# Patient Record
Sex: FEMALE | Race: WHITE | NOT HISPANIC OR LATINO | Employment: UNEMPLOYED | URBAN - METROPOLITAN AREA
[De-identification: names, ages, dates, MRNs, and addresses within clinical notes are randomized per-mention and may not be internally consistent; named-entity substitution may affect disease eponyms.]

---

## 2019-07-03 ENCOUNTER — HOSPITAL ENCOUNTER (EMERGENCY)
Facility: MEDICAL CENTER | Age: 49
End: 2019-07-04
Attending: EMERGENCY MEDICINE
Payer: MEDICAID

## 2019-07-03 ENCOUNTER — APPOINTMENT (OUTPATIENT)
Dept: RADIOLOGY | Facility: MEDICAL CENTER | Age: 49
End: 2019-07-03
Attending: EMERGENCY MEDICINE
Payer: MEDICAID

## 2019-07-03 DIAGNOSIS — S89.92XA INJURY OF LEFT KNEE, INITIAL ENCOUNTER: ICD-10-CM

## 2019-07-03 DIAGNOSIS — M25.562 ACUTE PAIN OF LEFT KNEE: ICD-10-CM

## 2019-07-03 PROCEDURE — 73700 CT LOWER EXTREMITY W/O DYE: CPT | Mod: LT

## 2019-07-03 PROCEDURE — 73564 X-RAY EXAM KNEE 4 OR MORE: CPT | Mod: LT

## 2019-07-03 PROCEDURE — 700102 HCHG RX REV CODE 250 W/ 637 OVERRIDE(OP): Performed by: EMERGENCY MEDICINE

## 2019-07-03 PROCEDURE — 700101 HCHG RX REV CODE 250: Performed by: EMERGENCY MEDICINE

## 2019-07-03 PROCEDURE — 96372 THER/PROPH/DIAG INJ SC/IM: CPT

## 2019-07-03 PROCEDURE — A9270 NON-COVERED ITEM OR SERVICE: HCPCS | Performed by: EMERGENCY MEDICINE

## 2019-07-03 PROCEDURE — 99285 EMERGENCY DEPT VISIT HI MDM: CPT

## 2019-07-03 PROCEDURE — 73610 X-RAY EXAM OF ANKLE: CPT | Mod: LT

## 2019-07-03 PROCEDURE — 700111 HCHG RX REV CODE 636 W/ 250 OVERRIDE (IP): Performed by: EMERGENCY MEDICINE

## 2019-07-03 RX ORDER — CYCLOBENZAPRINE HCL 10 MG
10 TABLET ORAL ONCE
Status: COMPLETED | OUTPATIENT
Start: 2019-07-03 | End: 2019-07-03

## 2019-07-03 RX ORDER — OXYCODONE HYDROCHLORIDE AND ACETAMINOPHEN 5; 325 MG/1; MG/1
2 TABLET ORAL ONCE
Status: COMPLETED | OUTPATIENT
Start: 2019-07-03 | End: 2019-07-03

## 2019-07-03 RX ORDER — LIDOCAINE 50 MG/G
1 PATCH TOPICAL ONCE
Status: DISCONTINUED | OUTPATIENT
Start: 2019-07-03 | End: 2019-07-04 | Stop reason: HOSPADM

## 2019-07-03 RX ORDER — KETOROLAC TROMETHAMINE 30 MG/ML
15 INJECTION, SOLUTION INTRAMUSCULAR; INTRAVENOUS ONCE
Status: COMPLETED | OUTPATIENT
Start: 2019-07-03 | End: 2019-07-03

## 2019-07-03 RX ADMIN — LIDOCAINE 1 PATCH: 50 PATCH TOPICAL at 23:37

## 2019-07-03 RX ADMIN — OXYCODONE HYDROCHLORIDE AND ACETAMINOPHEN 2 TABLET: 5; 325 TABLET ORAL at 21:02

## 2019-07-03 RX ADMIN — KETOROLAC TROMETHAMINE 15 MG: 30 INJECTION, SOLUTION INTRAMUSCULAR at 21:02

## 2019-07-03 RX ADMIN — CYCLOBENZAPRINE HYDROCHLORIDE 10 MG: 10 TABLET, FILM COATED ORAL at 22:34

## 2019-07-04 VITALS
WEIGHT: 155 LBS | HEIGHT: 67 IN | HEART RATE: 78 BPM | DIASTOLIC BLOOD PRESSURE: 77 MMHG | OXYGEN SATURATION: 90 % | TEMPERATURE: 98.4 F | SYSTOLIC BLOOD PRESSURE: 125 MMHG | BODY MASS INDEX: 24.33 KG/M2

## 2019-07-04 RX ORDER — CYCLOBENZAPRINE HCL 5 MG
5-10 TABLET ORAL 3 TIMES DAILY PRN
Qty: 30 TAB | Refills: 0 | Status: SHIPPED | OUTPATIENT
Start: 2019-07-04 | End: 2022-01-27

## 2019-07-04 RX ORDER — LIDOCAINE 50 MG/G
1 PATCH TOPICAL EVERY 24 HOURS
Qty: 21 PATCH | Refills: 0 | Status: SHIPPED | OUTPATIENT
Start: 2019-07-04 | End: 2019-07-25

## 2019-07-04 NOTE — ED PROVIDER NOTES
"ED Provider Note    Scribed for Papo Ramos M.D. by Chinmay Yadav. 7/3/2019  8:39 PM    Primary care provider: Pcp Pt States None  Means of arrival: EMS  History obtained from: patient  History limited by: none    CHIEF COMPLAINT  Chief Complaint   Patient presents with   • Knee Injury     Pt reportsd she was laying down, went to get up and left knee \"locked up\". This has happened before approx 3 yrs ago. Pt was given injection then and told to f/u with ortho- but she didn't.        HPI  Maggie Escobar is a 48 y.o. female who presents to the Emergency Department for left knee pain, onset earlier today. She states she was at the [employer] pool when she got up quickly and her knee locked. The patient states that the pain has gradually worsened. She states that her knee has been intermittently locking for the past 8 years, but this is the worst pain yet. In the past the pain ws allowed to be alleviated with some kid of shot. When she tries to move it feels like something is going to break. She denies any left hip pain. She has chronic lower back pain. She states no modifying or alleviating factors.    She denies any dislocation at this time.        She also reports left ankle pain, onset 3 months ago. She states that is continues to worsen throughout the day.     REVIEW OF SYSTEMS  Pertinent positives include: left knee pain. Pertinent negatives include: left hip pain. See history of present illness.    PAST MEDICAL HISTORY       SURGICAL HISTORY  patient denies any surgical history    SOCIAL HISTORY  Social History   Substance Use Topics   • Smoking status: Not on file   • Smokeless tobacco: Not on file   • Alcohol use Not on file      History   Drug use: Unknown       FAMILY HISTORY  No family history on file.    CURRENT MEDICATIONS  Home Medications    **Home medications have not yet been reviewed for this encounter**         ALLERGIES  Allergies   Allergen Reactions   • Pcn [Penicillins] Hives       PHYSICAL " "EXAM  VITAL SIGNS: /77   Temp 36.9 °C (98.4 °F) (Temporal)   Ht 1.702 m (5' 7\")   Wt 70.3 kg (155 lb)   SpO2 95%   BMI 24.28 kg/m²      Constitutional: Alert in no apparent distress.  HENT: No signs of trauma, Bilateral external ears normal, Nose normal. Uvula midline.   Eyes: Pupils are equal and reactive, Conjunctiva normal, Non-icteric.   Neck: Normal range of motion, No tenderness, Supple, No stridor.   Lymphatic: No lymphadenopathy noted.   Cardiovascular: Regular rate and rhythm, no murmurs.   Thorax & Lungs: Normal breath sounds, No respiratory distress, No wheezing, No chest tenderness.   Abdomen:  Soft, No tenderness, No peritoneal signs, No masses, No pulsatile masses.   Skin: Warm, Dry, No erythema, No rash.   Back: No bony tenderness, No CVA tenderness.   Extremities: Intact distal pulses, No edema,  No cyanosis.  Musculoskeletal:  Tenderness to palpation on lateral aspect of tibial plateua. no major deformities noted. 2+ peripheral pulses. normal distal dorsiflexion and plantarflexion.  5 out of 5 strength proximally.  Pain with knee range of motion.  No appreciable knee laxity or ligamentous laxity.  Negative anterior posterior drawer test and no appreciable lateral or medial laxity.  Neurologic: Alert , Normal motor function, Normal sensory function, No focal deficits noted.   Psychiatric: Affect normal, Judgment normal, Mood normal.     DIAGNOSTIC STUDIES / PROCEDURES    RADIOLOGY  CT-KNEE W/O LEFT   Final Result      1.  No acute fracture   2.  No joint effusion   3.  No bony erosion   4.  Loss of medial compartmental knee joint space.   5.  Meniscal or ligamentous injury is not excluded by CT      DX-KNEE COMPLETE 4+ LEFT   Final Result      No radiographic evidence of acute traumatic injury.      DX-ANKLE 3+ VIEWS LEFT   Final Result      No radiographic evidence of acute traumatic injury.        The radiologist's interpretation of all radiological studies have been reviewed by " me.    COURSE & MEDICAL DECISION MAKING  Nursing notes, VS, PMSFHx reviewed in chart.    48 y.o. female p/w chief complaint of left knee pain.    8:39 PM Patient seen and examined at bedside. I informed her that she will be placed in a knee immobilizer.     Pt repeated requesting injection of steroid or lidocaine that provided her with immediate relief at her last visit. At this time I do not feel comfortable giving her a numbing shot due to the potential of injury while affected extremity/knee is numb. She will receive imaging to rule out abdomnormality. She will be medicated with Oxycodone 325 mg and Toradol 15 mg for her pain.    10:24 PM Patient was reevaluated at bedside. Discussed radiology results with the patient and informed them that there is no acute traumatic injury.    I reinforced to the patient that giving her numbing injections will put her at risk for tearing ligaments.     10:49 Ortho was paged at this time.     10:54 PM I discussed the patient's case and the above findings with Dr. Benoit (Ortho) who advised to not administer a numbing/steriod shot.     Given patient's location of pain and ongoing pain, I obtain CT scan to rule out potential tibial plateau fracture or patellar dislocation/fracture not seen on xr    No e/o fx on CT.     11:24 PM Patient was reevaluated at bedside.    1:22 AM Patient was reevaluated at bedside. Discussed findings.  Patient states that pain is significantly improved with lidocaine patch.    I discussed follow up plans with ortho in next few days with pt. I discussed knee immobilization usage.  I am concerned that patient's pain may be secondary to chronic patellar dislocation and relocation and associated ligamentous injury.  No obvious physical exam evidence of patellar tendon rupture or fracture.  Patella is in line on physical exam and patient with full range of motion.  Distal sensation intact to light touch along with 2+ peripheral pulses.  Distal strength  intact.  I spent extensive time at patient's bedside discussing need for Ortho follow-up and work restrictions and home pain medication including alternating Tylenol and ibuprofen every 3 hours for the next several days.  Patient agrees to follow-up with orthopedist or primary care in less than week.  I feel like patient would likely benefit from outpatient MRI or physical therapy.    The patient will return for new or worsening symptoms and is stable at the time of discharge.    The patient is referred to a primary physician for blood pressure management, diabetic screening, and for all other preventative health concerns.    DISPOSITION:  Patient will be discharged home in stable condition.    FOLLOW UP:  Ramin Benoit M.D.  [address]  [facility] [address]  [phone]      Please call to schedule follow up appointment in next week with orthopedic surgery      OUTPATIENT MEDICATIONS:  Discharge Medication List as of 7/4/2019  1:15 AM      START taking these medications    Details   cyclobenzaprine (FLEXERIL) 5 MG tablet Take 1-2 Tabs by mouth 3 times a day as needed., Disp-30 Tab, R-0, Print Rx Paper      lidocaine (LIDODERM) 5 % Patch Apply 1 Patch to skin as directed every 24 hours for 21 days., Disp-21 Patch, R-0, Print Rx Paper              FINAL IMPRESSION  1. Injury of left knee, initial encounter    2. Acute pain of left knee          Chinmay CUEVAS (Scribe), am scribing for, and in the presence of, Papo Ramos M.D..    Electronically signed by: Chinmay Yadav (Scribe), 7/3/2019    Papo CUEVAS M.D. personally performed the services described in this documentation, as scribed by Chinmay Yadav in my presence, and it is both accurate and complete.  E  The note accurately reflects work and decisions made by me.  Papo Ramos  7/4/2019  2:33 AM

## 2019-07-04 NOTE — ED TRIAGE NOTES
"Chief Complaint   Patient presents with   • Knee Injury     Pt reportsd she was laying down, went to get up and left knee \"locked up\". This has happened before approx 3 yrs ago. Pt was given injection then and told to f/u with ortho- but she didn't.      [facility]. Pain in knee 7/10. Unable to move joint. Cold pack applied.   "

## 2019-07-04 NOTE — ED NOTES
Discharge instructions given to pt, pt verbalized understanding. VSS. Sent two prescriptions. All personal belongings accounted for. Pt used wheelchair to ED lobby. No IV.

## 2019-07-15 ENCOUNTER — HOSPITAL ENCOUNTER (EMERGENCY)
Facility: MEDICAL CENTER | Age: 49
End: 2019-07-15
Attending: EMERGENCY MEDICINE
Payer: MEDICAID

## 2019-07-15 VITALS
BODY MASS INDEX: 24.33 KG/M2 | RESPIRATION RATE: 16 BRPM | DIASTOLIC BLOOD PRESSURE: 87 MMHG | HEART RATE: 84 BPM | SYSTOLIC BLOOD PRESSURE: 129 MMHG | WEIGHT: 155 LBS | TEMPERATURE: 98.2 F | OXYGEN SATURATION: 97 % | HEIGHT: 67 IN

## 2019-07-15 DIAGNOSIS — M25.562 ACUTE PAIN OF LEFT KNEE: ICD-10-CM

## 2019-07-15 PROCEDURE — 99283 EMERGENCY DEPT VISIT LOW MDM: CPT

## 2019-07-15 RX ORDER — NAPROXEN 500 MG/1
500 TABLET ORAL 2 TIMES DAILY WITH MEALS
Qty: 20 TAB | Refills: 0 | Status: SHIPPED | OUTPATIENT
Start: 2019-07-15 | End: 2022-01-27

## 2019-07-15 NOTE — DISCHARGE PLANNING
Pt in the lobby stating [facility] does not take her insurance. She has contacted another orthopedic doctor that does take her insurance and it will be over 2 weeks to be seen in the office. She states they told her to go to PCP to get order for MRI (pt does not have PCP). She states she is in excruciating pain and cannot wait two weeks. She will need to check back in to ED as I cannot expedite appt.

## 2019-07-15 NOTE — ED TRIAGE NOTES
Chief Complaint   Patient presents with   • Knee Pain     left     States left knee locking up.  Seen here on 7-3-19 with similar symptoms.  Instructed to follow up with ortho.  Unable to due to insurance issues.  Requesting referral to ortho.  Triage process explained to patient.  Pt back to waiting room.  Pt instructed to inform RN if any changes or questions arise.

## 2019-07-15 NOTE — ED PROVIDER NOTES
"ED Provider Note    ED Provider    Means of arrival: Private car  History obtained from: Patient  History limited by: None    CHIEF COMPLAINT  Chief Complaint   Patient presents with   • Knee Pain     left       [facility]  Maggie Escobar is a 48 y.o. female who presents complaints of left knee pain.  This been going since the beginning of this month.  She was seen here brought in by ambulance and had a CT scan that showed no fracture.  She was seen by Dr. Gail estrada and at a outpatient visit, Dr. dorene estrada and does not accept her insurance they did see her the one time and did a injection into the knee.  This did provide temporary relief but now the pain is coming back.  Pain is worse with movement, and weightbearing.  She does have crutches and a knee immobilizer to assist with that she was not prescribed pain medication.    The initial pain came from the knee just locking up while she was in a fetal position, with knees bent.    She had a history of knees locking in the past that resolved with cortisone injection.    REVIEW OF SYSTEMS  See HPI for further details.     PAST MEDICAL HISTORY       SOCIAL HISTORY  Social History     Social History Main Topics   • Smoking status: Never Smoker   • Smokeless tobacco: Never Used   • Alcohol use No   • Drug use: No   • Sexual activity: Not on file       SURGICAL HISTORY  patient denies any surgical history    CURRENT MEDICATIONS  Home Medications    **Home medications have not yet been reviewed for this encounter**         ALLERGIES  Allergies   Allergen Reactions   • Pcn [Penicillins] Hives       PHYSICAL EXAM  VITAL SIGNS: /77   Pulse 98   Temp 36 °C (96.8 °F) (Temporal)   Resp 16   Ht 1.702 m (5' 7\")   Wt 70.3 kg (155 lb)   SpO2 97%   BMI 24.28 kg/m²   Constitutional: Alert in no apparent distress.  HENT: Normocephalic, Atraumatic, Mucous membranes are moist  Eyes:  Conjunctiva normal, non-icteric.   Heart: no peripheral cyanosis  Lungs: respirations are even " and unlabored  Skin: Warm, Dry,normal color  Neurologic: Alert, Grossly non-focal.   Psychiatric: Affect normal, Judgment normal, Mood normal, Appears appropriate and not intoxicated.   Extremity the left lower extremity has any showing no swelling, no effusion.  No bony tenderness.  Range of motion does elicit tenderness.  Distal neurovascular is normal    MEDICAL DECISION MAKING  This is a 48 y.o. female who presents knee pain, atraumatic.  CTs have been negative.  She is having difficulty getting a referral because she has to go through her primary doctor which she does not have to get the referral to see a limited number of orthopedics on her plan.    The patient has a list of orthopedics, I did give her an ER referral to a Dr. Pro however do not know if this will be adequate for insurance purposes.  I did explain this to the patient that she may still need to see her primary doctor.  She is given a prescription for Naprosyn for pain, she is to use her crutches and knee immobilizer for weightbearing as tolerated.  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    DIAGNOSTIC STUDIES / PROCEDURES    LABS      RADIOLOGY  No orders to display       COURSE  Pertinent Labs & Imaging studies reviewed. (See chart for details)      FINAL IMPRESSION  1. Acute pain of left knee

## 2019-07-15 NOTE — DISCHARGE INSTRUCTIONS
You are being referred from the ER to Dr. Pro, this may work well enough for the insurance company sometimes you will need to see your primary doctor to get an official referral to the orthopedist.  Otherwise continue with crutches, weightbearing as tolerated.  You are being prescribed medication to assist with the discomfort.

## 2020-11-23 ENCOUNTER — APPOINTMENT (OUTPATIENT)
Dept: RADIOLOGY | Facility: MEDICAL CENTER | Age: 50
End: 2020-11-23
Attending: EMERGENCY MEDICINE
Payer: MEDICAID

## 2020-11-23 ENCOUNTER — HOSPITAL ENCOUNTER (EMERGENCY)
Facility: MEDICAL CENTER | Age: 50
End: 2020-11-23
Attending: EMERGENCY MEDICINE
Payer: MEDICAID

## 2020-11-23 VITALS
TEMPERATURE: 97.1 F | HEART RATE: 69 BPM | OXYGEN SATURATION: 97 % | RESPIRATION RATE: 16 BRPM | SYSTOLIC BLOOD PRESSURE: 108 MMHG | HEIGHT: 67 IN | WEIGHT: 179.12 LBS | DIASTOLIC BLOOD PRESSURE: 65 MMHG | BODY MASS INDEX: 28.11 KG/M2

## 2020-11-23 DIAGNOSIS — K59.00 CONSTIPATION, UNSPECIFIED CONSTIPATION TYPE: ICD-10-CM

## 2020-11-23 DIAGNOSIS — R10.9 ABDOMINAL PAIN, UNSPECIFIED ABDOMINAL LOCATION: ICD-10-CM

## 2020-11-23 LAB
ALBUMIN SERPL BCP-MCNC: 4.7 G/DL (ref 3.2–4.9)
ALBUMIN/GLOB SERPL: 1.5 G/DL
ALP SERPL-CCNC: 93 U/L (ref 30–99)
ALT SERPL-CCNC: 33 U/L (ref 2–50)
ANION GAP SERPL CALC-SCNC: 10 MMOL/L (ref 7–16)
APPEARANCE UR: ABNORMAL
AST SERPL-CCNC: 31 U/L (ref 12–45)
BACTERIA #/AREA URNS HPF: ABNORMAL /HPF
BASOPHILS # BLD AUTO: 0.9 % (ref 0–1.8)
BASOPHILS # BLD: 0.06 K/UL (ref 0–0.12)
BILIRUB SERPL-MCNC: 0.4 MG/DL (ref 0.1–1.5)
BILIRUB UR QL STRIP.AUTO: NEGATIVE
BUN SERPL-MCNC: 6 MG/DL (ref 8–22)
CALCIUM SERPL-MCNC: 9.8 MG/DL (ref 8.5–10.5)
CHLORIDE SERPL-SCNC: 102 MMOL/L (ref 96–112)
CO2 SERPL-SCNC: 24 MMOL/L (ref 20–33)
COLOR UR: YELLOW
CREAT SERPL-MCNC: 0.63 MG/DL (ref 0.5–1.4)
EOSINOPHIL # BLD AUTO: 0.2 K/UL (ref 0–0.51)
EOSINOPHIL NFR BLD: 2.9 % (ref 0–6.9)
EPI CELLS #/AREA URNS HPF: ABNORMAL /HPF
ERYTHROCYTE [DISTWIDTH] IN BLOOD BY AUTOMATED COUNT: 40.7 FL (ref 35.9–50)
GLOBULIN SER CALC-MCNC: 3.1 G/DL (ref 1.9–3.5)
GLUCOSE SERPL-MCNC: 80 MG/DL (ref 65–99)
GLUCOSE UR STRIP.AUTO-MCNC: NEGATIVE MG/DL
HCG UR QL: NEGATIVE
HCT VFR BLD AUTO: 44.1 % (ref 37–47)
HGB BLD-MCNC: 14.4 G/DL (ref 12–16)
HYALINE CASTS #/AREA URNS LPF: ABNORMAL /LPF
IMM GRANULOCYTES # BLD AUTO: 0.05 K/UL (ref 0–0.11)
IMM GRANULOCYTES NFR BLD AUTO: 0.7 % (ref 0–0.9)
KETONES UR STRIP.AUTO-MCNC: NEGATIVE MG/DL
LEUKOCYTE ESTERASE UR QL STRIP.AUTO: NEGATIVE
LIPASE SERPL-CCNC: 19 U/L (ref 11–82)
LYMPHOCYTES # BLD AUTO: 2.37 K/UL (ref 1–4.8)
LYMPHOCYTES NFR BLD: 34.6 % (ref 22–41)
MCH RBC QN AUTO: 29.1 PG (ref 27–33)
MCHC RBC AUTO-ENTMCNC: 32.7 G/DL (ref 33.6–35)
MCV RBC AUTO: 89.3 FL (ref 81.4–97.8)
MICRO URNS: ABNORMAL
MONOCYTES # BLD AUTO: 0.3 K/UL (ref 0–0.85)
MONOCYTES NFR BLD AUTO: 4.4 % (ref 0–13.4)
NEUTROPHILS # BLD AUTO: 3.86 K/UL (ref 2–7.15)
NEUTROPHILS NFR BLD: 56.5 % (ref 44–72)
NITRITE UR QL STRIP.AUTO: NEGATIVE
NRBC # BLD AUTO: 0 K/UL
NRBC BLD-RTO: 0 /100 WBC
PH UR STRIP.AUTO: 5 [PH] (ref 5–8)
PLATELET # BLD AUTO: 279 K/UL (ref 164–446)
PMV BLD AUTO: 9.9 FL (ref 9–12.9)
POTASSIUM SERPL-SCNC: 4.3 MMOL/L (ref 3.6–5.5)
PROT SERPL-MCNC: 7.8 G/DL (ref 6–8.2)
PROT UR QL STRIP: NEGATIVE MG/DL
RBC # BLD AUTO: 4.94 M/UL (ref 4.2–5.4)
RBC # URNS HPF: ABNORMAL /HPF
RBC UR QL AUTO: NEGATIVE
SODIUM SERPL-SCNC: 136 MMOL/L (ref 135–145)
SP GR UR STRIP.AUTO: 1.02
UROBILINOGEN UR STRIP.AUTO-MCNC: 0.2 MG/DL
WBC # BLD AUTO: 6.8 K/UL (ref 4.8–10.8)
WBC #/AREA URNS HPF: ABNORMAL /HPF

## 2020-11-23 PROCEDURE — 36415 COLL VENOUS BLD VENIPUNCTURE: CPT

## 2020-11-23 PROCEDURE — 83690 ASSAY OF LIPASE: CPT

## 2020-11-23 PROCEDURE — 74176 CT ABD & PELVIS W/O CONTRAST: CPT

## 2020-11-23 PROCEDURE — 85025 COMPLETE CBC W/AUTO DIFF WBC: CPT

## 2020-11-23 PROCEDURE — 99283 EMERGENCY DEPT VISIT LOW MDM: CPT

## 2020-11-23 PROCEDURE — 81025 URINE PREGNANCY TEST: CPT

## 2020-11-23 PROCEDURE — 80053 COMPREHEN METABOLIC PANEL: CPT

## 2020-11-23 PROCEDURE — 81001 URINALYSIS AUTO W/SCOPE: CPT

## 2020-11-23 NOTE — ED TRIAGE NOTES
Maggie Escobar  49 y.o.  Chief Complaint   Patient presents with   • Constipation     x 4 weeks; only passing small hard balls of feces; pt reports trying to self disimpact but was unsuccessful; pt's abdomen is distended in triage; reports hx of same   • Flank Pain     R side; denies hx of kidney stones; pt reports dysuria but denies frequency, urgency, or hematuria; reports she was recently told she has WBCs in her urine

## 2020-11-24 NOTE — DISCHARGE INSTRUCTIONS
Please purchase a bottle of magnesium citrate, over the counter, and use as directed for constipation.   Please eat a high fiber diet.  Please return here for any other issues or concerns.

## 2020-11-24 NOTE — ED TRIAGE NOTES
"Patient vital signs rechecked and documented per [facility]. Patient denies any new needs at this time.  Patient updated on wait times, thanked for patience. Pt informed to alert triage tech or triage RN with any needs and/or changes in condition; patient verbalized understanding. Patient stated \"pain is getting worse'\"   "

## 2020-11-24 NOTE — ED PROVIDER NOTES
"ED Provider Note    CHIEF COMPLAINT  Chief Complaint   Patient presents with   • Constipation     x 4 weeks; only passing small hard balls of feces; pt reports trying to self disimpact but was unsuccessful; pt's abdomen is distended in triage; reports hx of same   • Flank Pain     R side; denies hx of kidney stones; pt reports dysuria but denies frequency, urgency, or hematuria; reports she was recently told she has WBCs in her urine       HPI  Maggie Escobar is a 49 y.o. female here for evaluation of abdominal distention.  Patient states that she has had this distention for 20 years, and that she always has constipation.  Patient states that it is becoming increasingly larger, but she denies any pain.  She has no fever chills or vomiting.  She does complain of some dysuria, urgency and frequency.  Nothing seems alleviate or exacerbate her symptoms, she has not taken any medication prior to this for the same.  Patient has no chest pain or shortness of breath.  Patient states that there is only distention, and no radiation of pain.  At this time, the patient states that she has no pain, but is here to \"get checked out.\"  She was supposed to have a CT scan done at [facility], and they would not do it because she did not have any lab work done recently.      ROS;  Please see HPI  O/W negative     PAST MEDICAL HISTORY   has a past medical history of Fibroid uterus.    SOCIAL HISTORY  Social History     Tobacco Use   • Smoking status: Never Smoker   • Smokeless tobacco: Never Used   Substance and Sexual Activity   • Alcohol use: No   • Drug use: No   • Sexual activity: Not on file       SURGICAL HISTORY   has a past surgical history that includes other orthopedic surgery.    CURRENT MEDICATIONS  Home Medications     Reviewed by Ronit Astorga R.N. (Registered Nurse) on 11/23/20 at 1521  Med List Status: Not Addressed   Medication Last Dose Status   cyclobenzaprine (FLEXERIL) 5 MG tablet  Active " "  hydrocodone-acetaminophen (NORCO) 7.5-325 MG per tablet  Active   naproxen (NAPROSYN) 500 MG Tab  Active                ALLERGIES  Allergies   Allergen Reactions   • Pcn [Penicillins] Hives       REVIEW OF SYSTEMS  See HPI for further details. Review of systems as above, otherwise all other systems are negative.     PHYSICAL EXAM  VITAL SIGNS: /74   Pulse 71   Temp 36.7 °C (98 °F) (Oral)   Resp 16   Ht 1.702 m (5' 7\")   Wt 81.3 kg (179 lb 2 oz)   SpO2 97%   BMI 28.05 kg/m²     Constitutional: Well developed, well nourished. No acute distress.  HEENT: Normocephalic, atraumatic. MMM  Neck: Supple, Full range of motion   Chest/Pulmonary:  No respiratory distress.  Equal expansion   Musculoskeletal: No deformity, no edema, neurovascular intact.  Abdomen; distended, no peritoneal signs  Neuro: Clear speech, appropriate, cooperative, cranial nerves II-XII grossly intact.  Psych: Normal mood and affect      Results for orders placed or performed during the hospital encounter of 11/23/20   URINALYSIS CULTURE, IF INDICATED    Specimen: Urine, Clean Catch   Result Value Ref Range    Color Yellow     Character Cloudy (A)     Specific Gravity 1.022 <1.035    Ph 5.0 5.0 - 8.0    Glucose Negative Negative mg/dL    Ketones Negative Negative mg/dL    Protein Negative Negative mg/dL    Bilirubin Negative Negative    Urobilinogen, Urine 0.2 Negative    Nitrite Negative Negative    Leukocyte Esterase Negative Negative    Occult Blood Negative Negative    Micro Urine Req Microscopic    HCG QUALITATIVE UR   Result Value Ref Range    Beta-Hcg Urine Negative Negative   CBC WITH DIFFERENTIAL   Result Value Ref Range    WBC 6.8 4.8 - 10.8 K/uL    RBC 4.94 4.20 - 5.40 M/uL    Hemoglobin 14.4 12.0 - 16.0 g/dL    Hematocrit 44.1 37.0 - 47.0 %    MCV 89.3 81.4 - 97.8 fL    MCH 29.1 27.0 - 33.0 pg    MCHC 32.7 (L) 33.6 - 35.0 g/dL    RDW 40.7 35.9 - 50.0 fL    Platelet Count 279 164 - 446 K/uL    MPV 9.9 9.0 - 12.9 fL    " Neutrophils-Polys 56.50 44.00 - 72.00 %    Lymphocytes 34.60 22.00 - 41.00 %    Monocytes 4.40 0.00 - 13.40 %    Eosinophils 2.90 0.00 - 6.90 %    Basophils 0.90 0.00 - 1.80 %    Immature Granulocytes 0.70 0.00 - 0.90 %    Nucleated RBC 0.00 /100 WBC    Neutrophils (Absolute) 3.86 2.00 - 7.15 K/uL    Lymphs (Absolute) 2.37 1.00 - 4.80 K/uL    Monos (Absolute) 0.30 0.00 - 0.85 K/uL    Eos (Absolute) 0.20 0.00 - 0.51 K/uL    Baso (Absolute) 0.06 0.00 - 0.12 K/uL    Immature Granulocytes (abs) 0.05 0.00 - 0.11 K/uL    NRBC (Absolute) 0.00 K/uL   COMP METABOLIC PANEL   Result Value Ref Range    Sodium 136 135 - 145 mmol/L    Potassium 4.3 3.6 - 5.5 mmol/L    Chloride 102 96 - 112 mmol/L    Co2 24 20 - 33 mmol/L    Anion Gap 10.0 7.0 - 16.0    Glucose 80 65 - 99 mg/dL    Bun 6 (L) 8 - 22 mg/dL    Creatinine 0.63 0.50 - 1.40 mg/dL    Calcium 9.8 8.5 - 10.5 mg/dL    AST(SGOT) 31 12 - 45 U/L    ALT(SGPT) 33 2 - 50 U/L    Alkaline Phosphatase 93 30 - 99 U/L    Total Bilirubin 0.4 0.1 - 1.5 mg/dL    Albumin 4.7 3.2 - 4.9 g/dL    Total Protein 7.8 6.0 - 8.2 g/dL    Globulin 3.1 1.9 - 3.5 g/dL    A-G Ratio 1.5 g/dL   LIPASE   Result Value Ref Range    Lipase 19 11 - 82 U/L   URINE MICROSCOPIC (W/UA)   Result Value Ref Range    WBC 2-5 /hpf    RBC 0-2 /hpf    Bacteria Few (A) None /hpf    Epithelial Cells Moderate (A) /hpf    Hyaline Cast 0-2 /lpf   ESTIMATED GFR   Result Value Ref Range    GFR If African American >60 >60 mL/min/1.73 m 2    GFR If Non African American >60 >60 mL/min/1.73 m 2     CT-RENAL COLIC EVALUATION(A/P W/O)   Final Result         1.  Constipation.   2.  Hepatic steatosis.   3.  Borderline hepatosplenomegaly.   4.  Right adnexal/ovarian cysts.                 PROCEDURES     MEDICAL RECORD  I have reviewed patient's medical record and pertinent results are listed.    COURSE & MEDICAL DECISION MAKING  I have reviewed any medical record information, laboratory studies and radiographic results as noted  above.    I you have had any blood pressure issues while here in the emergency department, please see your doctor for a further evaluation or work up.    7:45 PM  The pt has constipation on ct scan, and no other acute findings. I will recommend she uses colace and magnesium citrate for her constipation.   She will follow up, or return here for any other issues or concerns.     Differential diagnoses include but not limited to: Constipation, distention, small bowel obstruction, uterine fibroids    This patient presents with nominal pain.  At this time, I have counseled the patient/family regarding their medications, pain control, and follow up.  They will continue their medications, if any, as prescribed.  They will return immediately for any worsening symptoms and/or any other medical concerns.  They will see their doctor, or contact the doctor provided, in 1-2 days for follow up.       FINAL IMPRESSION  Abdominal pain  Constipation   Ovarian cyst      Electronically signed by: Iron De La Rosa D.O., 11/23/2020 6:48 PM

## 2020-11-24 NOTE — ED NOTES
Pt discharged. Pt provided information about chronic constipation. Pt educated to follow-up w/ PCP and to return to the hospital w/ any worsening symptoms. Pt walked to the lobby.

## 2022-01-19 ENCOUNTER — HOSPITAL ENCOUNTER (OUTPATIENT)
Dept: LAB | Facility: MEDICAL CENTER | Age: 52
End: 2022-01-19
Attending: NURSE PRACTITIONER
Payer: COMMERCIAL

## 2022-01-19 LAB
25(OH)D3 SERPL-MCNC: 29 NG/ML (ref 30–100)
ALBUMIN SERPL BCP-MCNC: 4.5 G/DL (ref 3.2–4.9)
ALBUMIN/GLOB SERPL: 1.6 G/DL
ALP SERPL-CCNC: 65 U/L (ref 30–99)
ALT SERPL-CCNC: 19 U/L (ref 2–50)
ANION GAP SERPL CALC-SCNC: 10 MMOL/L (ref 7–16)
AST SERPL-CCNC: 20 U/L (ref 12–45)
BASOPHILS # BLD AUTO: 1.6 % (ref 0–1.8)
BASOPHILS # BLD: 0.09 K/UL (ref 0–0.12)
BILIRUB SERPL-MCNC: 0.3 MG/DL (ref 0.1–1.5)
BUN SERPL-MCNC: 8 MG/DL (ref 8–22)
CALCIUM SERPL-MCNC: 9.4 MG/DL (ref 8.5–10.5)
CHLORIDE SERPL-SCNC: 103 MMOL/L (ref 96–112)
CHOLEST SERPL-MCNC: 161 MG/DL (ref 100–199)
CO2 SERPL-SCNC: 24 MMOL/L (ref 20–33)
CREAT SERPL-MCNC: 0.72 MG/DL (ref 0.5–1.4)
CRP SERPL HS-MCNC: <0.3 MG/DL (ref 0–0.75)
EOSINOPHIL # BLD AUTO: 0.19 K/UL (ref 0–0.51)
EOSINOPHIL NFR BLD: 3.4 % (ref 0–6.9)
ERYTHROCYTE [DISTWIDTH] IN BLOOD BY AUTOMATED COUNT: 42.7 FL (ref 35.9–50)
GLOBULIN SER CALC-MCNC: 2.9 G/DL (ref 1.9–3.5)
GLUCOSE SERPL-MCNC: 83 MG/DL (ref 65–99)
HCT VFR BLD AUTO: 39.6 % (ref 37–47)
HDLC SERPL-MCNC: 34 MG/DL
HGB BLD-MCNC: 12.8 G/DL (ref 12–16)
IMM GRANULOCYTES # BLD AUTO: 0.02 K/UL (ref 0–0.11)
IMM GRANULOCYTES NFR BLD AUTO: 0.4 % (ref 0–0.9)
LDLC SERPL CALC-MCNC: 99 MG/DL
LYMPHOCYTES # BLD AUTO: 1.7 K/UL (ref 1–4.8)
LYMPHOCYTES NFR BLD: 30.6 % (ref 22–41)
MCH RBC QN AUTO: 29 PG (ref 27–33)
MCHC RBC AUTO-ENTMCNC: 32.3 G/DL (ref 33.6–35)
MCV RBC AUTO: 89.6 FL (ref 81.4–97.8)
MONOCYTES # BLD AUTO: 0.28 K/UL (ref 0–0.85)
MONOCYTES NFR BLD AUTO: 5 % (ref 0–13.4)
NEUTROPHILS # BLD AUTO: 3.27 K/UL (ref 2–7.15)
NEUTROPHILS NFR BLD: 59 % (ref 44–72)
NRBC # BLD AUTO: 0 K/UL
NRBC BLD-RTO: 0 /100 WBC
PLATELET # BLD AUTO: 302 K/UL (ref 164–446)
PMV BLD AUTO: 10.5 FL (ref 9–12.9)
POTASSIUM SERPL-SCNC: 4.4 MMOL/L (ref 3.6–5.5)
PROT SERPL-MCNC: 7.4 G/DL (ref 6–8.2)
RBC # BLD AUTO: 4.42 M/UL (ref 4.2–5.4)
SODIUM SERPL-SCNC: 137 MMOL/L (ref 135–145)
T3 SERPL-MCNC: 114 NG/DL (ref 60–181)
T4 FREE SERPL-MCNC: 0.82 NG/DL (ref 0.93–1.7)
T4 SERPL-MCNC: 5.3 UG/DL (ref 4–12)
THYROPEROXIDASE AB SERPL-ACNC: 396 IU/ML (ref 0–9)
TRIGL SERPL-MCNC: 142 MG/DL (ref 0–149)
TSH SERPL DL<=0.005 MIU/L-ACNC: 6.35 UIU/ML (ref 0.38–5.33)
TSH SERPL DL<=0.005 MIU/L-ACNC: 6.35 UIU/ML (ref 0.38–5.33)
WBC # BLD AUTO: 5.6 K/UL (ref 4.8–10.8)

## 2022-01-19 PROCEDURE — 36415 COLL VENOUS BLD VENIPUNCTURE: CPT

## 2022-01-19 PROCEDURE — 84439 ASSAY OF FREE THYROXINE: CPT

## 2022-01-19 PROCEDURE — 84480 ASSAY TRIIODOTHYRONINE (T3): CPT

## 2022-01-19 PROCEDURE — 83036 HEMOGLOBIN GLYCOSYLATED A1C: CPT

## 2022-01-19 PROCEDURE — 86376 MICROSOMAL ANTIBODY EACH: CPT

## 2022-01-19 PROCEDURE — 86800 THYROGLOBULIN ANTIBODY: CPT

## 2022-01-19 PROCEDURE — 80053 COMPREHEN METABOLIC PANEL: CPT

## 2022-01-19 PROCEDURE — 86038 ANTINUCLEAR ANTIBODIES: CPT

## 2022-01-19 PROCEDURE — 86140 C-REACTIVE PROTEIN: CPT

## 2022-01-19 PROCEDURE — 82306 VITAMIN D 25 HYDROXY: CPT

## 2022-01-19 PROCEDURE — 85025 COMPLETE CBC W/AUTO DIFF WBC: CPT

## 2022-01-19 PROCEDURE — 80061 LIPID PANEL: CPT

## 2022-01-19 PROCEDURE — 84443 ASSAY THYROID STIM HORMONE: CPT | Mod: 91

## 2022-01-19 PROCEDURE — 85652 RBC SED RATE AUTOMATED: CPT

## 2022-01-20 ENCOUNTER — HOSPITAL ENCOUNTER (OUTPATIENT)
Facility: MEDICAL CENTER | Age: 52
End: 2022-01-20
Attending: NURSE PRACTITIONER
Payer: COMMERCIAL

## 2022-01-20 LAB
ERYTHROCYTE [SEDIMENTATION RATE] IN BLOOD BY WESTERGREN METHOD: 10 MM/HOUR (ref 0–25)
EST. AVERAGE GLUCOSE BLD GHB EST-MCNC: 114 MG/DL
HBA1C MFR BLD: 5.6 % (ref 4–5.6)

## 2022-01-20 PROCEDURE — 87329 GIARDIA AG IA: CPT

## 2022-01-20 PROCEDURE — 87328 CRYPTOSPORIDIUM AG IA: CPT

## 2022-01-21 LAB
G LAMBLIA+C PARVUM AG STL QL RAPID: NORMAL
NUCLEAR IGG SER QL IA: NORMAL
SIGNIFICANT IND 70042: NORMAL
SITE SITE: NORMAL
SOURCE SOURCE: NORMAL
THYROGLOB AB SERPL-ACNC: 5.5 IU/ML (ref 0–4)

## 2022-01-27 ENCOUNTER — OFFICE VISIT (OUTPATIENT)
Dept: URGENT CARE | Facility: CLINIC | Age: 52
End: 2022-01-27
Payer: COMMERCIAL

## 2022-01-27 VITALS
RESPIRATION RATE: 18 BRPM | OXYGEN SATURATION: 99 % | SYSTOLIC BLOOD PRESSURE: 128 MMHG | BODY MASS INDEX: 27.65 KG/M2 | HEART RATE: 88 BPM | WEIGHT: 176.2 LBS | TEMPERATURE: 97.8 F | HEIGHT: 67 IN | DIASTOLIC BLOOD PRESSURE: 87 MMHG

## 2022-01-27 DIAGNOSIS — K59.00 CONSTIPATION, UNSPECIFIED CONSTIPATION TYPE: ICD-10-CM

## 2022-01-27 DIAGNOSIS — L65.9 HAIR LOSS: ICD-10-CM
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
## 2022-01-27 PROCEDURE — 99213 OFFICE O/P EST LOW 20 MIN: CPT | Performed by: INTERNAL MEDICINE

## 2022-01-27 RX ORDER — DESONIDE 0.5 MG/G
1 OINTMENT TOPICAL 2 TIMES DAILY
Qty: 60 G | Refills: 0 | Status: SHIPPED | OUTPATIENT
Start: 2022-01-27

## 2022-01-27 ASSESSMENT — FIBROSIS 4 INDEX: FIB4 SCORE: 0.74

## 2022-01-27 ASSESSMENT — ENCOUNTER SYMPTOMS
NAUSEA: 0
CHILLS: 0
DEPRESSION: 0
FEVER: 0
COUGH: 0
SHORTNESS OF BREATH: 0
VOMITING: 0

## 2022-01-28 NOTE — PROGRESS NOTES
"Chief Complaint:      HPI:      Maggie Escobar is a 51 y.o. female with hx of uterine fibroids, hx of torn left lateral meniscus.  She reports that she saw a gynecologist before who recommended a hysterectomy but she didn't follow up.  She also claims that she think she has \"poisoning\" of her system because she has hair loss.  She even brought a bag of hair that looks like a bezoar.   The patient exhibited flight of ideas and magical thinking during the visit.  She claims that she has pigeon mites and they are attacking her. She thinks she has parasites because she been \"backed up\" She showed me various close up pictures of poor resolution and she claims they are parasites or mites.   She also claims that she has marjolin's disease. She claims that she cant sleep.  She also reports that she is constipated she has backed up.  She claims that she has tried Colace, suppositories and they do not work          ROS:   Review of Systems   Constitutional: Negative for chills and fever.   Respiratory: Negative for cough and shortness of breath.    Cardiovascular: Negative for chest pain.   Gastrointestinal: Negative for nausea and vomiting.   Skin: Positive for rash.   Psychiatric/Behavioral: Negative for depression and suicidal ideas.        Past Medical History:  She There are no problems to display for this patient.    Past Surgical History:  She   Past Surgical History:   Procedure Laterality Date   • OTHER ORTHOPEDIC SURGERY      L lateral meniscus repair      Allergies:  She Pcn [penicillins]   Current Medications:  She   Current Outpatient Medications:   •  naproxen (NAPROSYN) 500 MG Tab, Take 1 Tab by mouth 2 times a day, with meals. (Patient not taking: Reported on 1/27/2022), Disp: 20 Tab, Rfl: 0  •  cyclobenzaprine (FLEXERIL) 5 MG tablet, Take 1-2 Tabs by mouth 3 times a day as needed. (Patient not taking: Reported on 1/27/2022), Disp: 30 Tab, Rfl: 0  •  hydrocodone-acetaminophen (NORCO) 7.5-325 MG per tablet, " Take 1-2 Tabs by mouth every 6 hours as needed. (Patient not taking: Reported on 1/27/2022), Disp: 4 Tab, Rfl: 0  Social History:  She   Social History     Socioeconomic History   • Marital status: Single     Spouse name: Not on file   • Number of children: Not on file   • Years of education: Not on file   • Highest education level: Not on file   Occupational History   • Not on file   Tobacco Use   • Smoking status: Never Smoker   • Smokeless tobacco: Never Used   Substance and Sexual Activity   • Alcohol use: No   • Drug use: No   • Sexual activity: Not on file   Other Topics Concern   • Not on file   Social History Narrative   • Not on file     Social Determinants of Health     Financial Resource Strain:    • Difficulty of Paying Living Expenses: Not on file   Food Insecurity:    • Worried About Running Out of Food in the Last Year: Not on file   • Ran Out of Food in the Last Year: Not on file   Transportation Needs:    • Lack of Transportation (Medical): Not on file   • Lack of Transportation (Non-Medical): Not on file   Physical Activity:    • Days of Exercise per Week: Not on file   • Minutes of Exercise per Session: Not on file   Stress:    • Feeling of Stress : Not on file   Social Connections:    • Frequency of Communication with Friends and Family: Not on file   • Frequency of Social Gatherings with Friends and Family: Not on file   • Attends Buddhist Services: Not on file   • Active Member of Clubs or Organizations: Not on file   • Attends Club or Organization Meetings: Not on file   • Marital Status: Not on file   Intimate Partner Violence:    • Fear of Current or Ex-Partner: Not on file   • Emotionally Abused: Not on file   • Physically Abused: Not on file   • Sexually Abused: Not on file   Housing Stability:    • Unable to Pay for Housing in the Last Year: Not on file   • Number of Places Lived in the Last Year: Not on file   • Unstable Housing in the Last Year: Not on file      Family History:   Her  "No family history on file.     PHYSICAL EXAM:    Vital signs: /87 (BP Location: Left arm, Patient Position: Sitting)   Pulse 88   Temp 36.6 °C (97.8 °F) (Temporal)   Resp 18   Ht 1.702 m (5' 7\")   Wt 79.9 kg (176 lb 3.2 oz)   SpO2 99%   BMI 27.60 kg/m²   Physical Exam  Constitutional:       Appearance: She is obese.   HENT:      Head: Normocephalic and atraumatic.   Eyes:      Extraocular Movements: Extraocular movements intact.      Pupils: Pupils are equal, round, and reactive to light.   Cardiovascular:      Rate and Rhythm: Normal rate and regular rhythm.   Pulmonary:      Effort: Pulmonary effort is normal.      Breath sounds: Normal breath sounds.   Skin:     General: Skin is warm.      Comments: Multiple skin excoriations noted on both lower extremities and both forearms.  Some of the lesions are in various stages of healing.  There are some lesions that look like insect bites with a central punctum   Neurological:      Mental Status: She is alert.         Labs:  Lab Results   Component Value Date/Time    HBA1C 5.6 01/19/2022 02:19 PM      Lab Results   Component Value Date/Time    CHOLSTRLTOT 161 01/19/2022 1419    TRIGLYCERIDE 142 01/19/2022 1419    HDL 34 (A) 01/19/2022 1419    LDL 99 01/19/2022 1419     No results found for: MICROALBCALC, MALBCRT, MALBEXCR, QBQTKD83, MICROALBUR, MICRALB, UMICROALBUM, MICROALBTIM   Lab Results   Component Value Date/Time    CREATININE 0.72 01/19/2022 02:19 PM           ASSESSMENT/PLAN:   1. Insect bite, unspecified site, initial encounter  I recommended she try desonide ointment on her lesions  I advised her not to scratch her lesions    2. Hair loss  I am referring her to dermatology for hair loss    3. Constipation, unspecified constipation type  Recommended  MiraLax to the patient but she got upset  And she stated that I was not listening to her      Return if symptoms worsen or fail to improve.       This patient during there office visit was started on " new medication.  Side effects of new medications were discussed with the patient today in the office. The patient was supplied paperwork on this new medication.    Differential diagnosis, natural history, supportive care, and indications for immediate follow-up discussed at length.   Instructed to return to  or nearest emergency department if symptoms fail to improve, for any change in condition, further concerns, or new concerning symptoms.    Patient states understanding of the plan of care and discharge instructions.      Sander Arambula MD, FACE, ECNU  01/27/22

## 2022-03-14 ENCOUNTER — ANESTHESIA (OUTPATIENT)
Dept: SURGERY | Facility: MEDICAL CENTER | Age: 52
End: 2022-03-14
Payer: MEDICAID

## 2022-03-14 ENCOUNTER — ANESTHESIA EVENT (OUTPATIENT)
Dept: SURGERY | Facility: MEDICAL CENTER | Age: 52
End: 2022-03-14
Payer: MEDICAID

## 2022-03-14 ENCOUNTER — HOSPITAL ENCOUNTER (OUTPATIENT)
Facility: MEDICAL CENTER | Age: 52
End: 2022-03-16
Attending: SPECIALIST | Admitting: SPECIALIST
Payer: MEDICAID

## 2022-03-14 DIAGNOSIS — G89.18 POSTOPERATIVE PAIN: ICD-10-CM

## 2022-03-14 DIAGNOSIS — G89.18 POST-OP PAIN: ICD-10-CM

## 2022-03-14 DIAGNOSIS — D62 ACUTE POST-HEMORRHAGIC ANEMIA: ICD-10-CM

## 2022-03-14 DIAGNOSIS — K59.01 CONSTIPATION BY DELAYED COLONIC TRANSIT: ICD-10-CM

## 2022-03-14 PROBLEM — Z90.710 STATUS POST LAPAROSCOPIC HYSTERECTOMY: Status: ACTIVE | Noted: 2022-03-14

## 2022-03-14 LAB
EXTERNAL QUALITY CONTROL: NORMAL
HCG UR QL: NEGATIVE
PATHOLOGY CONSULT NOTE: NORMAL
SARS-COV+SARS-COV-2 AG RESP QL IA.RAPID: NEGATIVE

## 2022-03-14 PROCEDURE — 501577 HCHG TROCAR, STEP 11MM: Performed by: SPECIALIST

## 2022-03-14 PROCEDURE — 501664 HCHG TUBING, FILTER STRYKER: Performed by: SPECIALIST

## 2022-03-14 PROCEDURE — 700102 HCHG RX REV CODE 250 W/ 637 OVERRIDE(OP): Performed by: SPECIALIST

## 2022-03-14 PROCEDURE — A9270 NON-COVERED ITEM OR SERVICE: HCPCS | Performed by: SPECIALIST

## 2022-03-14 PROCEDURE — 502587 HCHG PACK, D&C: Performed by: SPECIALIST

## 2022-03-14 PROCEDURE — 160002 HCHG RECOVERY MINUTES (STAT): Performed by: SPECIALIST

## 2022-03-14 PROCEDURE — 700105 HCHG RX REV CODE 258: Performed by: SPECIALIST

## 2022-03-14 PROCEDURE — 160035 HCHG PACU - 1ST 60 MINS PHASE I: Performed by: SPECIALIST

## 2022-03-14 PROCEDURE — 700111 HCHG RX REV CODE 636 W/ 250 OVERRIDE (IP): Performed by: ANESTHESIOLOGY

## 2022-03-14 PROCEDURE — 501838 HCHG SUTURE GENERAL: Performed by: SPECIALIST

## 2022-03-14 PROCEDURE — 160036 HCHG PACU - EA ADDL 30 MINS PHASE I: Performed by: SPECIALIST

## 2022-03-14 PROCEDURE — 160009 HCHG ANES TIME/MIN: Performed by: SPECIALIST

## 2022-03-14 PROCEDURE — 160041 HCHG SURGERY MINUTES - EA ADDL 1 MIN LEVEL 4: Performed by: SPECIALIST

## 2022-03-14 PROCEDURE — 700101 HCHG RX REV CODE 250: Performed by: SPECIALIST

## 2022-03-14 PROCEDURE — 700105 HCHG RX REV CODE 258: Performed by: ANESTHESIOLOGY

## 2022-03-14 PROCEDURE — 500854 HCHG NEEDLE, INSUFFLATION FOR STEP: Performed by: SPECIALIST

## 2022-03-14 PROCEDURE — 88307 TISSUE EXAM BY PATHOLOGIST: CPT

## 2022-03-14 PROCEDURE — A9270 NON-COVERED ITEM OR SERVICE: HCPCS | Performed by: ANESTHESIOLOGY

## 2022-03-14 PROCEDURE — G0378 HOSPITAL OBSERVATION PER HR: HCPCS

## 2022-03-14 PROCEDURE — 502704 HCHG DEVICE, LIGASURE IMPACT: Performed by: SPECIALIST

## 2022-03-14 PROCEDURE — 501330 HCHG SET, CYSTO IRRIG TUBING: Performed by: SPECIALIST

## 2022-03-14 PROCEDURE — 700102 HCHG RX REV CODE 250 W/ 637 OVERRIDE(OP): Performed by: ANESTHESIOLOGY

## 2022-03-14 PROCEDURE — 700104 HCHG RX REV CODE 254: Performed by: ANESTHESIOLOGY

## 2022-03-14 PROCEDURE — 160048 HCHG OR STATISTICAL LEVEL 1-5: Performed by: SPECIALIST

## 2022-03-14 PROCEDURE — 500886 HCHG PACK, LAPAROSCOPY: Performed by: SPECIALIST

## 2022-03-14 PROCEDURE — 160029 HCHG SURGERY MINUTES - 1ST 30 MINS LEVEL 4: Performed by: SPECIALIST

## 2022-03-14 PROCEDURE — 87426 SARSCOV CORONAVIRUS AG IA: CPT | Performed by: SPECIALIST

## 2022-03-14 PROCEDURE — 81025 URINE PREGNANCY TEST: CPT

## 2022-03-14 PROCEDURE — 500512 HCHG ENDO PEANUT: Performed by: SPECIALIST

## 2022-03-14 PROCEDURE — 700101 HCHG RX REV CODE 250: Performed by: ANESTHESIOLOGY

## 2022-03-14 RX ORDER — IBUPROFEN 800 MG/1
800 TABLET ORAL 3 TIMES DAILY
Status: DISCONTINUED | OUTPATIENT
Start: 2022-03-14 | End: 2022-03-14

## 2022-03-14 RX ORDER — ACETAMINOPHEN 500 MG
1000 TABLET ORAL EVERY 6 HOURS
Status: DISCONTINUED | OUTPATIENT
Start: 2022-03-14 | End: 2022-03-16 | Stop reason: HOSPADM

## 2022-03-14 RX ORDER — IBUPROFEN 800 MG/1
800 TABLET ORAL 3 TIMES DAILY PRN
Status: DISCONTINUED | OUTPATIENT
Start: 2022-03-19 | End: 2022-03-15

## 2022-03-14 RX ORDER — OXYCODONE HCL 5 MG/5 ML
10 SOLUTION, ORAL ORAL
Status: COMPLETED | OUTPATIENT
Start: 2022-03-14 | End: 2022-03-14

## 2022-03-14 RX ORDER — DOCUSATE SODIUM 100 MG/1
100 CAPSULE, LIQUID FILLED ORAL 2 TIMES DAILY
Status: DISCONTINUED | OUTPATIENT
Start: 2022-03-14 | End: 2022-03-16 | Stop reason: HOSPADM

## 2022-03-14 RX ORDER — MORPHINE SULFATE 15 MG/1
15 TABLET ORAL
Status: DISCONTINUED | OUTPATIENT
Start: 2022-03-14 | End: 2022-03-16 | Stop reason: HOSPADM

## 2022-03-14 RX ORDER — AMOXICILLIN 250 MG
1 CAPSULE ORAL NIGHTLY
Status: DISCONTINUED | OUTPATIENT
Start: 2022-03-14 | End: 2022-03-16 | Stop reason: HOSPADM

## 2022-03-14 RX ORDER — ONDANSETRON 2 MG/ML
4 INJECTION INTRAMUSCULAR; INTRAVENOUS EVERY 4 HOURS PRN
Status: DISCONTINUED | OUTPATIENT
Start: 2022-03-14 | End: 2022-03-16 | Stop reason: HOSPADM

## 2022-03-14 RX ORDER — METHADONE HYDROCHLORIDE 5 MG/5ML
47 SOLUTION ORAL DAILY
COMMUNITY

## 2022-03-14 RX ORDER — EPINEPHRINE 1 MG/ML(1)
AMPUL (ML) INJECTION
Status: DISPENSED
Start: 2022-03-14 | End: 2022-03-14

## 2022-03-14 RX ORDER — M-VIT,TX,IRON,MINS/CALC/FOLIC 27MG-0.4MG
1 TABLET ORAL DAILY
COMMUNITY

## 2022-03-14 RX ORDER — ALPRAZOLAM 1 MG/1
1 TABLET ORAL 3 TIMES DAILY PRN
COMMUNITY

## 2022-03-14 RX ORDER — BISACODYL 10 MG
10 SUPPOSITORY, RECTAL RECTAL
Status: DISCONTINUED | OUTPATIENT
Start: 2022-03-14 | End: 2022-03-16 | Stop reason: HOSPADM

## 2022-03-14 RX ORDER — LIDOCAINE HYDROCHLORIDE 40 MG/ML
SOLUTION TOPICAL PRN
Status: DISCONTINUED | OUTPATIENT
Start: 2022-03-14 | End: 2022-03-14 | Stop reason: SURG

## 2022-03-14 RX ORDER — OXYCODONE HCL 5 MG/5 ML
5 SOLUTION, ORAL ORAL
Status: COMPLETED | OUTPATIENT
Start: 2022-03-14 | End: 2022-03-14

## 2022-03-14 RX ORDER — MEPERIDINE HYDROCHLORIDE 25 MG/ML
6.25 INJECTION INTRAMUSCULAR; INTRAVENOUS; SUBCUTANEOUS
Status: DISCONTINUED | OUTPATIENT
Start: 2022-03-14 | End: 2022-03-14 | Stop reason: HOSPADM

## 2022-03-14 RX ORDER — HALOPERIDOL 5 MG/ML
1 INJECTION INTRAMUSCULAR
Status: DISCONTINUED | OUTPATIENT
Start: 2022-03-14 | End: 2022-03-14 | Stop reason: HOSPADM

## 2022-03-14 RX ORDER — METHADONE HYDROCHLORIDE 40 MG/1
40 TABLET ORAL DAILY
Status: COMPLETED | OUTPATIENT
Start: 2022-03-14 | End: 2022-03-15

## 2022-03-14 RX ORDER — MORPHINE SULFATE 15 MG/1
30 TABLET ORAL
Status: DISCONTINUED | OUTPATIENT
Start: 2022-03-14 | End: 2022-03-16 | Stop reason: HOSPADM

## 2022-03-14 RX ORDER — CEFAZOLIN SODIUM 1 G/3ML
INJECTION, POWDER, FOR SOLUTION INTRAMUSCULAR; INTRAVENOUS PRN
Status: DISCONTINUED | OUTPATIENT
Start: 2022-03-14 | End: 2022-03-14 | Stop reason: SURG

## 2022-03-14 RX ORDER — DEXAMETHASONE SODIUM PHOSPHATE 4 MG/ML
4 INJECTION, SOLUTION INTRA-ARTICULAR; INTRALESIONAL; INTRAMUSCULAR; INTRAVENOUS; SOFT TISSUE
Status: DISCONTINUED | OUTPATIENT
Start: 2022-03-14 | End: 2022-03-16 | Stop reason: HOSPADM

## 2022-03-14 RX ORDER — AMOXICILLIN 250 MG
1 CAPSULE ORAL
Status: DISCONTINUED | OUTPATIENT
Start: 2022-03-14 | End: 2022-03-14

## 2022-03-14 RX ORDER — DIPHENHYDRAMINE HYDROCHLORIDE 50 MG/ML
25 INJECTION INTRAMUSCULAR; INTRAVENOUS EVERY 6 HOURS PRN
Status: DISCONTINUED | OUTPATIENT
Start: 2022-03-14 | End: 2022-03-16 | Stop reason: HOSPADM

## 2022-03-14 RX ORDER — KETOROLAC TROMETHAMINE 30 MG/ML
INJECTION, SOLUTION INTRAMUSCULAR; INTRAVENOUS PRN
Status: DISCONTINUED | OUTPATIENT
Start: 2022-03-14 | End: 2022-03-14 | Stop reason: SURG

## 2022-03-14 RX ORDER — METHADONE HYDROCHLORIDE 5 MG/5ML
47 SOLUTION ORAL DAILY
Status: DISCONTINUED | OUTPATIENT
Start: 2022-03-14 | End: 2022-03-14

## 2022-03-14 RX ORDER — HYDROMORPHONE HYDROCHLORIDE 1 MG/ML
0.1 INJECTION, SOLUTION INTRAMUSCULAR; INTRAVENOUS; SUBCUTANEOUS
Status: DISCONTINUED | OUTPATIENT
Start: 2022-03-14 | End: 2022-03-14 | Stop reason: HOSPADM

## 2022-03-14 RX ORDER — HYDROMORPHONE HYDROCHLORIDE 1 MG/ML
0.2 INJECTION, SOLUTION INTRAMUSCULAR; INTRAVENOUS; SUBCUTANEOUS
Status: DISCONTINUED | OUTPATIENT
Start: 2022-03-14 | End: 2022-03-14 | Stop reason: HOSPADM

## 2022-03-14 RX ORDER — ACETAMINOPHEN 500 MG
1000 TABLET ORAL EVERY 6 HOURS PRN
Status: DISCONTINUED | OUTPATIENT
Start: 2022-03-19 | End: 2022-03-16 | Stop reason: HOSPADM

## 2022-03-14 RX ORDER — MORPHINE SULFATE 10 MG/ML
6 INJECTION, SOLUTION INTRAMUSCULAR; INTRAVENOUS
Status: DISCONTINUED | OUTPATIENT
Start: 2022-03-14 | End: 2022-03-16 | Stop reason: HOSPADM

## 2022-03-14 RX ORDER — DIPHENHYDRAMINE HYDROCHLORIDE 50 MG/ML
12.5 INJECTION INTRAMUSCULAR; INTRAVENOUS
Status: DISCONTINUED | OUTPATIENT
Start: 2022-03-14 | End: 2022-03-14 | Stop reason: HOSPADM

## 2022-03-14 RX ORDER — BUPIVACAINE HYDROCHLORIDE AND EPINEPHRINE 2.5; 5 MG/ML; UG/ML
INJECTION, SOLUTION EPIDURAL; INFILTRATION; INTRACAUDAL; PERINEURAL
Status: DISCONTINUED | OUTPATIENT
Start: 2022-03-14 | End: 2022-03-14 | Stop reason: HOSPADM

## 2022-03-14 RX ORDER — DEXAMETHASONE SODIUM PHOSPHATE 4 MG/ML
INJECTION, SOLUTION INTRA-ARTICULAR; INTRALESIONAL; INTRAMUSCULAR; INTRAVENOUS; SOFT TISSUE PRN
Status: DISCONTINUED | OUTPATIENT
Start: 2022-03-14 | End: 2022-03-14 | Stop reason: SURG

## 2022-03-14 RX ORDER — IBUPROFEN 800 MG/1
800 TABLET ORAL 3 TIMES DAILY PRN
Status: DISCONTINUED | OUTPATIENT
Start: 2022-03-19 | End: 2022-03-14

## 2022-03-14 RX ORDER — ONDANSETRON 2 MG/ML
INJECTION INTRAMUSCULAR; INTRAVENOUS PRN
Status: DISCONTINUED | OUTPATIENT
Start: 2022-03-14 | End: 2022-03-14 | Stop reason: SURG

## 2022-03-14 RX ORDER — DEXMEDETOMIDINE HYDROCHLORIDE 100 UG/ML
INJECTION, SOLUTION INTRAVENOUS PRN
Status: DISCONTINUED | OUTPATIENT
Start: 2022-03-14 | End: 2022-03-14 | Stop reason: SURG

## 2022-03-14 RX ORDER — BUPIVACAINE HYDROCHLORIDE 2.5 MG/ML
INJECTION, SOLUTION EPIDURAL; INFILTRATION; INTRACAUDAL
Status: DISPENSED
Start: 2022-03-14 | End: 2022-03-14

## 2022-03-14 RX ORDER — HYDROMORPHONE HYDROCHLORIDE 1 MG/ML
0.4 INJECTION, SOLUTION INTRAMUSCULAR; INTRAVENOUS; SUBCUTANEOUS
Status: DISCONTINUED | OUTPATIENT
Start: 2022-03-14 | End: 2022-03-14 | Stop reason: HOSPADM

## 2022-03-14 RX ORDER — IBUPROFEN 800 MG/1
800 TABLET ORAL EVERY 8 HOURS PRN
Status: DISCONTINUED | OUTPATIENT
Start: 2022-03-14 | End: 2022-03-15

## 2022-03-14 RX ORDER — SCOLOPAMINE TRANSDERMAL SYSTEM 1 MG/1
1 PATCH, EXTENDED RELEASE TRANSDERMAL
Status: DISCONTINUED | OUTPATIENT
Start: 2022-03-14 | End: 2022-03-16 | Stop reason: HOSPADM

## 2022-03-14 RX ORDER — SIMETHICONE 125 MG
125 TABLET,CHEWABLE ORAL 3 TIMES DAILY PRN
Status: DISCONTINUED | OUTPATIENT
Start: 2022-03-14 | End: 2022-03-16 | Stop reason: HOSPADM

## 2022-03-14 RX ORDER — SODIUM CHLORIDE, SODIUM LACTATE, POTASSIUM CHLORIDE, CALCIUM CHLORIDE 600; 310; 30; 20 MG/100ML; MG/100ML; MG/100ML; MG/100ML
INJECTION, SOLUTION INTRAVENOUS CONTINUOUS
Status: ACTIVE | OUTPATIENT
Start: 2022-03-14 | End: 2022-03-14

## 2022-03-14 RX ORDER — SODIUM CHLORIDE, SODIUM LACTATE, POTASSIUM CHLORIDE, CALCIUM CHLORIDE 600; 310; 30; 20 MG/100ML; MG/100ML; MG/100ML; MG/100ML
INJECTION, SOLUTION INTRAVENOUS CONTINUOUS
Status: DISCONTINUED | OUTPATIENT
Start: 2022-03-14 | End: 2022-03-16 | Stop reason: HOSPADM

## 2022-03-14 RX ORDER — ONDANSETRON 2 MG/ML
4 INJECTION INTRAMUSCULAR; INTRAVENOUS
Status: DISCONTINUED | OUTPATIENT
Start: 2022-03-14 | End: 2022-03-14 | Stop reason: HOSPADM

## 2022-03-14 RX ORDER — METHADONE HYDROCHLORIDE 10 MG/1
10 TABLET ORAL DAILY
Status: COMPLETED | OUTPATIENT
Start: 2022-03-14 | End: 2022-03-15

## 2022-03-14 RX ORDER — HALOPERIDOL 5 MG/ML
1 INJECTION INTRAMUSCULAR EVERY 6 HOURS PRN
Status: DISCONTINUED | OUTPATIENT
Start: 2022-03-14 | End: 2022-03-16 | Stop reason: HOSPADM

## 2022-03-14 RX ORDER — SODIUM CHLORIDE, SODIUM LACTATE, POTASSIUM CHLORIDE, CALCIUM CHLORIDE 600; 310; 30; 20 MG/100ML; MG/100ML; MG/100ML; MG/100ML
INJECTION, SOLUTION INTRAVENOUS CONTINUOUS
Status: DISCONTINUED | OUTPATIENT
Start: 2022-03-14 | End: 2022-03-14 | Stop reason: HOSPADM

## 2022-03-14 RX ORDER — ENEMA 19; 7 G/133ML; G/133ML
1 ENEMA RECTAL
Status: DISCONTINUED | OUTPATIENT
Start: 2022-03-14 | End: 2022-03-16 | Stop reason: HOSPADM

## 2022-03-14 RX ORDER — POLYETHYLENE GLYCOL 3350 17 G/17G
1 POWDER, FOR SOLUTION ORAL 2 TIMES DAILY PRN
Status: DISCONTINUED | OUTPATIENT
Start: 2022-03-14 | End: 2022-03-16 | Stop reason: HOSPADM

## 2022-03-14 RX ADMIN — HYDROMORPHONE HYDROCHLORIDE 0.4 MG: 1 INJECTION, SOLUTION INTRAMUSCULAR; INTRAVENOUS; SUBCUTANEOUS at 11:55

## 2022-03-14 RX ADMIN — METHADONE HYDROCHLORIDE 10 MG: 10 TABLET ORAL at 18:16

## 2022-03-14 RX ADMIN — MORPHINE SULFATE 15 MG: 15 TABLET ORAL at 15:39

## 2022-03-14 RX ADMIN — HYDROMORPHONE HYDROCHLORIDE 0.4 MG: 1 INJECTION, SOLUTION INTRAMUSCULAR; INTRAVENOUS; SUBCUTANEOUS at 12:55

## 2022-03-14 RX ADMIN — HYDROMORPHONE HYDROCHLORIDE 0.4 MG: 1 INJECTION, SOLUTION INTRAMUSCULAR; INTRAVENOUS; SUBCUTANEOUS at 12:13

## 2022-03-14 RX ADMIN — SODIUM CHLORIDE, POTASSIUM CHLORIDE, SODIUM LACTATE AND CALCIUM CHLORIDE: 600; 310; 30; 20 INJECTION, SOLUTION INTRAVENOUS at 07:19

## 2022-03-14 RX ADMIN — DEXMEDETOMIDINE 20 MCG: 200 INJECTION, SOLUTION INTRAVENOUS at 07:43

## 2022-03-14 RX ADMIN — METHADONE HYDROCHLORIDE 40 MG: 40 TABLET ORAL at 18:16

## 2022-03-14 RX ADMIN — LIDOCAINE HYDROCHLORIDE 4 ML: 40 SOLUTION TOPICAL at 07:38

## 2022-03-14 RX ADMIN — FENTANYL CITRATE 100 MCG: 50 INJECTION, SOLUTION INTRAMUSCULAR; INTRAVENOUS at 07:38

## 2022-03-14 RX ADMIN — HYDROMORPHONE HYDROCHLORIDE 0.4 MG: 1 INJECTION, SOLUTION INTRAMUSCULAR; INTRAVENOUS; SUBCUTANEOUS at 12:42

## 2022-03-14 RX ADMIN — IBUPROFEN 800 MG: 800 TABLET, FILM COATED ORAL at 18:12

## 2022-03-14 RX ADMIN — OXYCODONE HYDROCHLORIDE 10 MG: 5 SOLUTION ORAL at 11:35

## 2022-03-14 RX ADMIN — DEXMEDETOMIDINE 10 MCG: 200 INJECTION, SOLUTION INTRAVENOUS at 09:54

## 2022-03-14 RX ADMIN — SODIUM CHLORIDE, POTASSIUM CHLORIDE, SODIUM LACTATE AND CALCIUM CHLORIDE: 600; 310; 30; 20 INJECTION, SOLUTION INTRAVENOUS at 12:41

## 2022-03-14 RX ADMIN — FENTANYL CITRATE 50 MCG: 50 INJECTION INTRAMUSCULAR; INTRAVENOUS at 11:43

## 2022-03-14 RX ADMIN — DEXMEDETOMIDINE 10 MCG: 200 INJECTION, SOLUTION INTRAVENOUS at 08:39

## 2022-03-14 RX ADMIN — INDIGO CARMINE 2 ML: 8 INJECTION, SOLUTION INTRAMUSCULAR; INTRAVENOUS at 10:19

## 2022-03-14 RX ADMIN — HYDROMORPHONE HYDROCHLORIDE 0.4 MG: 1 INJECTION, SOLUTION INTRAMUSCULAR; INTRAVENOUS; SUBCUTANEOUS at 12:32

## 2022-03-14 RX ADMIN — SODIUM CHLORIDE, POTASSIUM CHLORIDE, SODIUM LACTATE AND CALCIUM CHLORIDE: 600; 310; 30; 20 INJECTION, SOLUTION INTRAVENOUS at 18:30

## 2022-03-14 RX ADMIN — ONDANSETRON 4 MG: 2 INJECTION INTRAMUSCULAR; INTRAVENOUS at 10:28

## 2022-03-14 RX ADMIN — MIDAZOLAM 2 MG: 1 INJECTION INTRAMUSCULAR; INTRAVENOUS at 07:35

## 2022-03-14 RX ADMIN — ROCURONIUM BROMIDE 100 MG: 10 INJECTION, SOLUTION INTRAVENOUS at 07:37

## 2022-03-14 RX ADMIN — SUGAMMADEX 200 MG: 100 INJECTION, SOLUTION INTRAVENOUS at 10:53

## 2022-03-14 RX ADMIN — ACETAMINOPHEN 1000 MG: 500 TABLET ORAL at 18:12

## 2022-03-14 RX ADMIN — ACETAMINOPHEN 1000 MG: 500 TABLET ORAL at 23:54

## 2022-03-14 RX ADMIN — SODIUM CHLORIDE, POTASSIUM CHLORIDE, SODIUM LACTATE AND CALCIUM CHLORIDE: 600; 310; 30; 20 INJECTION, SOLUTION INTRAVENOUS at 09:17

## 2022-03-14 RX ADMIN — CEFAZOLIN 2 G: 330 INJECTION, POWDER, FOR SOLUTION INTRAMUSCULAR; INTRAVENOUS at 07:40

## 2022-03-14 RX ADMIN — ROCURONIUM BROMIDE 10 MG: 10 INJECTION, SOLUTION INTRAVENOUS at 09:17

## 2022-03-14 RX ADMIN — FENTANYL CITRATE 50 MCG: 50 INJECTION INTRAMUSCULAR; INTRAVENOUS at 11:35

## 2022-03-14 RX ADMIN — DOCUSATE SODIUM 100 MG: 100 CAPSULE, LIQUID FILLED ORAL at 18:15

## 2022-03-14 RX ADMIN — DEXAMETHASONE SODIUM PHOSPHATE 4 MG: 4 INJECTION, SOLUTION INTRA-ARTICULAR; INTRALESIONAL; INTRAMUSCULAR; INTRAVENOUS; SOFT TISSUE at 09:49

## 2022-03-14 RX ADMIN — MORPHINE SULFATE 15 MG: 15 TABLET ORAL at 21:53

## 2022-03-14 RX ADMIN — KETOROLAC TROMETHAMINE 30 MG: 30 INJECTION, SOLUTION INTRAMUSCULAR at 10:23

## 2022-03-14 RX ADMIN — SENNOSIDES AND DOCUSATE SODIUM 1 TABLET: 50; 8.6 TABLET ORAL at 21:53

## 2022-03-14 RX ADMIN — PROPOFOL 200 MG: 10 INJECTION, EMULSION INTRAVENOUS at 07:35

## 2022-03-14 ASSESSMENT — PAIN DESCRIPTION - PAIN TYPE
TYPE: ACUTE PAIN;CHRONIC PAIN
TYPE: SURGICAL PAIN
TYPE: SURGICAL PAIN
TYPE: ACUTE PAIN;CHRONIC PAIN
TYPE: CHRONIC PAIN;SURGICAL PAIN
TYPE: SURGICAL PAIN;CHRONIC PAIN;ACUTE PAIN
TYPE: ACUTE PAIN;CHRONIC PAIN
TYPE: ACUTE PAIN;CHRONIC PAIN;SURGICAL PAIN
TYPE: ACUTE PAIN;CHRONIC PAIN;SURGICAL PAIN

## 2022-03-14 ASSESSMENT — PAIN SCALES - GENERAL: PAIN_LEVEL: 2

## 2022-03-14 ASSESSMENT — FIBROSIS 4 INDEX: FIB4 SCORE: 0.74

## 2022-03-14 NOTE — OR SURGEON
Immediate Post OP Note    PreOp Diagnosis:   Menorrhagia.  Uterine enlargement.  Previous ultrasound revealed evidence of uterine fibroids.  It is possible that the patient has adenomyosis which is certainly explained her symptoms of menorrhagia and findings on ultrasound of uterine enlargement.    PostOp Diagnosis:   Menorrhagia.  Uterine enlargement.  Previous ultrasound revealed evidence of uterine fibroids.  It is possible that the patient has adenomyosis which is certainly explained her symptoms of menorrhagia and findings on ultrasound of uterine enlargement.  Intraperitoneal adhesions of the omentum to the anterior aspect of the pelvis and to the anterior abdominal wall.  Also right ovarian cyst which was incised and drained.    Procedure(s):  HYSTERECTOMY, LAPAROSCOPIC - TOTAL - Wound Class: Clean  Laparoscopic incision and drainage of right ovarian cyst.  SALPINGECTOMY (bilateral salpingectomy)- Wound Class: Clean  CYSTOSCOPY - Wound Class: Clean Contaminated  EXTENSIVE LYSIS, ADHESIONS, LAPAROSCOPIC - Wound Class: Clean    Surgeon(s):  NICK Castillo M.D.    Anesthesiologist/Type of Anesthesia:  Anesthesiologist: Don Sauer M.D./General    Surgical Staff:  Circulator: Yaneli Biggs R.N.  Relief Circulator: Martina Virk R.N.  Relief Scrub: Jessica Mock  Scrub Person: Demar Matthews    Specimens removed if any:  ID Type Source Tests Collected by Time Destination   A : UTERUS, GIGI FALLOPIAN TUBES Other Other PATHOLOGY SPECIMEN Oleg Underwood M.D. 3/14/2022  9:07 AM        Estimated Blood Loss:   Approximately 300 cc.    Findings:   Speculum exam under anesthesia reveals no vulvar or vaginal or cervical lesions and no cervical or vaginal discharge.  The cervix is well visualized and is multiparous.  During laparoscopy extensive adhesions of the omentum to the anterior abdominal wall and anterior pelvis are noted and these are lysed.  During laparoscopy the uterus is found to be  somewhat enlarged.   Serosal surfaces of the uterus appear normal.   Both Fallopian tubes appear to be normal.   The right ovary is found to have a cyst.  The left ovary is small but normal.   Both ovarian fossae are normal.   The liver edge is seen. Some changes consistent with hepatic steatosis are appreciated.   During cystoscopy bilateral ureteral jets of blue urine are seen indicating that both ureters are patent.     Complications:   None.         3/14/2022 11:05 AM Oleg Underwood M.D.

## 2022-03-14 NOTE — ANESTHESIA TIME REPORT
Anesthesia Start and Stop Event Times     Date Time Event    3/14/2022 0731 Anesthesia Start     0735 Ready for Procedure     1104 Anesthesia Stop        Responsible Staff  03/14/22    Name Role Begin End    Don Sauer M.D. Anesth 0731 1104        Preop Diagnosis (Free Text):  Pre-op Diagnosis     MENORRHAGIA, UTERINE FIBROIDS        Preop Diagnosis (Codes):    Premium Reason  Non-Premium    Comments:

## 2022-03-14 NOTE — H&P
Maggie Escobar  YOB: 1970  Date of today's surgery: 2022  Facility: [facility]    ID: The patient is a very pleasant 51-year-old grand multipara. The patient has had 4 vaginal deliveries which were then followed by 2  sections.    Chief Complaint: The patient complains of menorrhagia.    History of Present Illness: The patient has had complaints of menorrhagia and these complaints have been ongoing and worsening and ultrasound performed by myself on March 10, 2022 revealed uterine enlargement suggestive of the possibility of adenomyosis. A previous ultrasound performed at [facility] on 2021 revealed (according to the report from [facility]) “leiomyomatous uterus” and the dimensions of the uterus given in this report are 9.3 centimeters by 7.8 centimeters by 6.1 centimeters. I performed an ultrasound four days ago, namely on Thursday, March 10, 2022 and this ultrasound revealed that the uterus measures 6.76 centimeters longitudinally, not including the cervix, by 5.71 centimeters in AP dimension, by 6.60 centimeters in transverse dimension. This ultrasound did not reveal any indisputable evidence of uterine fibroids but smaller uterine fibroids cannot be ruled out. In any case, this ultrasound performed by myself 4 days ago did indeed reveal uterine enlargement which would be consistent with adenomyosis and would also explain the patient’s symptoms of menorrhagia. Of note the patient has informed me that she previously had seen Dr. Hamida Mcduffie and said that Dr. Hamida Mcduffie had performed endometrial biopsy. She tells me that this biopsy did not reveal any abnormalities.    Past Medical History: The patient says that she does have a history of hypertension. She has also had problems with substance abuse. She does have panic attacks and bipolar disorder and PTSD.    Past Surgical History: The patient  has had 2 previous  sections.    Medications: The patient takes methadone.    Allergies: The patient says that she is allergic to penicillin.    Social History: The patient says that she has never smoked and that she has never consumed alcoholic beverages. She has in the past used marijuana.    Review of Systems  General: The patient denies any fevers, chills, sweats.  Pulmonary: The patient denies any coughing, wheezing, chest pain, shortness of breath.  Cardiovascular: The patient denies any palpitations, dyspnea, chest pain.  Gastrointestinal: The patient denies any nausea, vomiting, diarrhea, constipation, hematochezia, melena, history of hepatitis, history of jaundice.  Genitourinary: The patient complains of severe menorrhagia. She does not report dysmenorrhea.  Musculoskeletal: The patient denies any arthralgias or myalgias.   Neurological: No headaches or syncope or seizures.     Physical Exam:   Vital Signs: The patient's vital signs are stable and she is afebrile.  General: The patient appears well developed and well nourished and relaxed and alert and comfortable and in no apparent distress.    HEENT :  Normo-cephalic, atraumatic, pupils equal, round, reactive to light and accommodation, extra ocular motions intact, pharynx clear; there is no thyromegaly. There is no cervical lymphadenopathy.  Chest: Heart regular rate and rhythm, with no murmurs or rubs or gallops; the lungs are clear to auscultation bilaterally.  Abdomen: Examination of the patient’s abdomen with the patient in the dorsal supine position reveals that the abdomen is soft and nontender and does reveal some abdominal distention and reveals that there is no evidence of hepatomegaly and that there is no evidence of splenomegaly. Examination reveals no evidence of any abdominal masses. There is a Pfannenstiel scar.   Pelvic: Bimanual exam reveals no evidence of cervical motion tenderness and no evidence of any tenderness to palpation  of the uterine corpus and no evidence of uterine enlargement and no evidence of any adnexal masses or tenderness either on the right or the left.  Extremities: No clubbing or cyanosis or edema.   Neurological: non-focal.     Assessment:   Menorrhagia.  Uterine enlargement.  Previous ultrasound revealed evidence of uterine fibroids.  It is possible that the patient has adenomyosis which is certainly explained her symptoms of menorrhagia and findings on ultrasound of uterine enlargement.    Plan:   We will proceed today with total laparoscopic hysterectomy, bilateral salpingectomy, followed by cystoscopy. I have discussed with the patient and explained to the patient in detail and at length what total laparoscopic hysterectomy, bilateral salpingectomy, followed by cystoscopy is, and what total laparoscopic hysterectomy, bilateral salpingectomy, followed by cystoscopy involves, and I have discussed with her and explained to her in detail at length the risks and benefits and alternatives of total laparoscopic hysterectomy, bilateral salpingectomy, followed by cystoscopy. After our discussions and after answering her question she told me that she very much wishes for us to proceed with total laparoscopic hysterectomy, bilateral salpingectomy, followed by cystoscopy.        ________________________  Oleg Underwood M.D.

## 2022-03-14 NOTE — OP REPORT
DATE OF SERVICE:  03/14/2022     PREOPERATIVE DIAGNOSES:  1.  Menorrhagia.  2.  Uterine enlargement.  3.  Previous ultrasound revealed evidence of uterine fibroids.  4.  It is possible that the patient has adenomyosis, which would certainly   explain her symptoms of menorrhagia and findings of uterine enlargement.     POSTOPERATIVE DIAGNOSES:  1.  Menorrhagia.  2.  Uterine enlargement.  3.  Previous ultrasound revealed evidence of uterine fibroids.  4.  It is possible that the patient has adenomyosis, which would certainly   explain her symptoms of menorrhagia and findings of uterine enlargement.  5.  Intraperitoneal adhesions of the omentum to the anterior aspect of the   pelvis and to the anterior abdominal wall and also right ovarian cyst, which   was incised and drained.     PROCEDURES:  Laparoscopy, diagnostic and operative with laparoscopic lysis of   adhesions (extensive) and total laparoscopic hysterectomy and bilateral   salpingectomy, followed by cystoscopy.     SURGEON:  Oleg Underwood MD     ASSISTANT:  Merle Lee MD     ANESTHESIA:  General endotracheal tube anesthesia.     ANESTHESIOLOGIST:  Don Sauer MD     FINDINGS:  Speculum exam under anesthesia reveals no vulvar or vaginal or   cervical lesions and no cervical or vaginal discharge.  The cervix was well   visualized and is multiparous.     During laparoscopy, extensive adhesions of the omentum to the anterior   abdominal wall and anterior pelvis were noted and these were lysed.     During laparoscopy, the uterus was found to be somewhat enlarged.  The uterus   appears normal otherwise.  Serosal surfaces of the uterus appeared normal.    Both fallopian tubes appeared to be normal.     The right ovary is found to contain a cyst, which when incised and drained   yields clear to straw-colored fluid.     The left ovary is small, but normal otherwise.     Both ovarian fossae are normal.  The cul-de-sac appears normal.     The liver edge is  seen during laparoscopy and some changes, which could   perhaps be consistent with mild hepatic steatosis are appreciated when   examining the liver edge during laparoscopy.  During cystoscopy, bilateral   ureteral jets of blue urine are seen indicating that both ureters are patent   and the dome of the bladder examined and found to be normal.     SPECIMENS:  Uterus and both fallopian tubes.     COMPLICATIONS:  None.     ESTIMATED BLOOD LOSS:  Approximately 300 mL.     DESCRIPTION OF PROCEDURE:  After appropriate consents have been obtained, the   patient was taken to the operating room and given general anesthesia.  She was   prepped and draped in the dorsal lithotomy position.  A Toure catheter was   placed and noted to be draining urine.  Speculum exam was performed and   revealed no vulvar or vaginal or cervical lesions.  The cervix was well   visualized and appears multiparous.  The anterior aspect of the cervix was   grasped with a single tooth tenaculum.  The uterus was dilated with Hanks   dilators.  A Vicryl suture was placed at the 12 o'clock position of the cervix   in the usual fashion.  A second Vicryl suture was placed in the cervix, this   one at the 6 o'clock position of the cervix again in the usual fashion.  A   large VCare uterine manipulator was advanced through the endocervical canal   into the intrauterine cavity and the balloon at the tip of the VCare uterine   manipulator was inflated with 10 mL of air.  The single tooth tenaculum was   removed from the cervix.  The two ends of the Vicryl suture at the 12 o'clock   position of the cervix were grasped and the needles cut off and set aside.    The two ends of Vicryl suture at the 12 o'clock position of the cervix were   placed through 2 adjacent holes on one side of the inner cup of the VCare   uterine manipulator.  The two ends of Vicryl suture at the 6 o'clock position   of the cervix were placed through 2 adjacent holes on the opposite side  of the   inner cup of the VCare uterine manipulator and then the needle on the Vicryl   suture at the 6 o'clock position of the cervix was cut off and set aside.  The   speculum was then removed.  The inner cup of the VCare uterine manipulator   was advanced along the shaft of the VCare uterine manipulator into the vaginal   vault and pressed against the cervix.  The two ends of Vicryl suture at the   12 o'clock position of the cervix were tied several times and the excess   Vicryl sutures cut off.  The two ends of Vicryl suture at the 6 o'clock   position of the cervix were tied several times and excess Vicryl sutures cut   off.  The cervix was in this way nicely secured against the cervix.  The blue   portion of the VCare uterine manipulator was advanced along the shaft of the   VCare uterine manipulator into the vaginal vault and secured in the usual   fashion.  The 's gloves were changed.  Attention was directed to the   abdomen where a small (approximately 1.5 cm) infraumbilical incision was made   with a scalpel after the overlying skin and subcutaneous tissues had been   infiltrated with local anesthetic.  A Veress needle was advanced through this   incision into the peritoneal cavity and proper placement in the peritoneal   cavity was verified with a Aguirre hanging drop technique.  The peritoneal   cavity was then insufflated with approximately 2-3 liters of carbon dioxide   gas.  The peritoneal cavity was actually insufflated slightly more than 3   liters of carbon dioxide gas.  The Veress needle was removed and a 10-12 mm   port was introduced through the infraumbilical incision into the peritoneal   cavity utilizing a blunt-tip trocar.  The central portion of this port was   removed and a 10 mm 0-degree laparoscope was inserted through the remaining   sleeve and proper entry in the peritoneal cavity was verified visually with   laparoscope.  Immediately noted that there were extensive adhesions of  the   omentum to the anterior abdominal wall in the midline inferior to the   umbilicus.  A 10 mm port was placed in the left lower quadrant under direct   laparoscopic visualization after overlying skin and subcutaneous tissue had   been infiltrated with local anesthetic and utilized a blunt-tip trocar.  A 10   mm port was placed in the right lower quadrant under direct laparoscopic   visualization after overlying skin and subcutaneous tissue had been   infiltrated with local anesthetic and utilizing a blunt-tip trocar.  The   laparoscope was placed through the left lower quadrant port and the 5 mm   LigaSure bipolar cutting forceps instrument with the monopolar element is   used, using the bipolar cautery, to serially thoroughly cauterized, sealed,   and cut the adhesions of the omentum to the anterior abdominal wall.  This   process required significant time, but once completed, the omentum was   completely dissected off of the anterior abdominal wall.  The laparoscope was   then placed through the infraumbilical port and a 10 mm suprapubic port was   placed under direct laparoscopic visualization after overlying skin and   subcutaneous tissue had been infiltrated with local anesthetic and utilizing a   blunt-tip trocar.  At this time, the patient was placed in further   Trendelenburg position and the uterus was mobilized and findings were as noted   above and at this time, the 10 mm 0-degree laparoscope was exchanged for the   10 mm 30-degree laparoscope.  During this procedure, the 10 mm 30-degree   laparoscope was placed not only through the infraumbilical port, but at times   through the right lower quadrant port and other times through the left lower   quadrant port.  The uterus was mobilized and findings were as noted above.    The right fallopian tube was identified and resected with the LigaSure   instrument in the usual fashion by serially thoroughly cauterizing, sealing,   and cutting the right  mesosalpinx.  Some bleeding seen coming from the area of   the right adnexa was eventually controlled with bipolar cautery with LigaSure   instrument.  A right ovarian cyst was seen and incised and drained, yielding   clear to straw-colored fluid.  The right proper ovarian ligament was   thoroughly cauterized, sealed, and cut with LigaSure instrument.  The right   proximal round ligament was thoroughly cauterized, sealed and cut with   LigaSure instrument.  The tissue between the incision in the right proximal   round ligament and the right proper ovarian ligament was thoroughly   cauterized, sealed, and cut with LigaSure instrument.  Anterior and posterior   leaves of the right broad ligament were dissected out bluntly and the anterior   leaf of the right broad ligament was serially cauterized, sealed, and cut   with LigaSure instrument down to around the level of the junction between the   uterine corpus and uterine cervix.  The posterior leaf of the right broad   ligament serially thoroughly cauterized, sealed, and cut with LigaSure   instrument down to around the level of the junction between the uterine corpus   and uterine cervix.  Ascending branches of uterine vessels on the right were   dissected out and exposed and once exposed, thoroughly cauterized, sealed, and   cut with LigaSure instrument.  Attention was directed to the left side of the   uterus.  The left fallopian tube was resected (left salpingectomy was   performed) in the usual fashion using the LigaSure instrument, by serially   thoroughly cauterizing, sealing, and cutting the left mesosalpinx from distal   to proximal and then thoroughly cauterizing, sealing, and cutting the left   proximal fallopian tube and the left fallopian tube was submitted as a   specimen.  Excellent hemostasis was observed.  The left proper ovarian   ligament was thoroughly cauterized, sealed, and cut with LigaSure instrument.    The left proximal round ligament was  thoroughly cauterized, and cut with   LigaSure instrument.  The tissue in between the incision in the left proximal   round ligament and left proper ovarian ligament was thoroughly cauterized,   sealed, and cut with the LigaSure instrument.  The most proximal portions of   left broad ligament including anterior and posterior leaves of left broad   ligament including ipsilateral ascending branches of uterine vessels were   serially thoroughly cauterized, sealed, and cut with LigaSure instrument down   to around the level of the junction between the uterine corpus and uterine   cervix.  Remnants of anterior and posterior leaves of left broad ligament were   dissected out bluntly to expose the uterine vessels on the left, which once   exposed, thoroughly cauterized, sealed, and cut with LigaSure instrument.  The   bladder was found to be fairly adherent anteriorly ostensibly from the   patient's previous  sections.  The serosa overlying the anterior lower   uterine segment was incised bilaterally mostly with the monopolar cautery   with monopolar element of the LigaSure instrument and also with bipolar   instrument.  This requires significant time because the bladder was very   adherent anteriorly, but once completed, the bladder was dissected away   nicely.  Uterosacral cardinal ligament complexes were bilaterally thoroughly   cauterized, sealed and cut at their insertions into the uterus.  At this time,   the uterine corpus was noted to be cyanotic indicating ischemia of the   uterus.  Also, at this time, the junction between the cervix and vagina was   nicely appreciated, thanks to the inner lip of the inner cup of the VCare   uterine manipulator.  The tissue overlying the inner lip of the inner cup of   the VCare uterine manipulator circumferentially incised mostly with the   monopolar cautery with the monopolar cautery instrument, part of the LigaSure   instrument and also with bipolar cutting instrument  (with LigaSure instrument)   as well.  This process required significant time, but once completed, then   the uterus has no more attachments to the rest of the body and at this time,   delivered into the vaginal vault and it was deliberately left in the vaginal   vault at this time so as to maintain pneumoperitoneum.  The 's gloves   were changed.  The vaginal cuff was then closed by placing several interrupted   figure-of-eight sutures of 0 Vicryl on CT1 needles always using   intracorporeal suturing technique and extracorporeal knot tying technique.    First, the 2 angle sutures were placed.  First, angle suture on the right was   placed by placing a figure-of-eight suture of 0 Vicryl on CT1 needle using   intracorporeal suturing technique and extracorporeal knot technique and when   this needle was cut off and set aside, the two ends of the suture brought out   through the right lower quadrant port and tension/traction was placed on these   sutures to place tension/traction of the right angle of vaginal cuff in order   to enhance exposure of the vaginal cuff so as to facilitate closure of the   vaginal cuff and this tension/traction was maintained by clamping these   sutures on the right lower quadrant port.  The same was then done on the left.    A figure-of-eight suture using 0 Vicryl on CT1 needle was placed in the left   angle of vaginal cuff using intracorporeal suturing technique and   extracorporeal knot tying technique and after the needles cut off from this   suture, the two ends of the suture brought out of the left lower quadrant port   and tension/traction was placed on these sutures to place tension/traction on   the left angle of vaginal cuff in order to enhance exposure of the vaginal   cuff so as to facilitate closure of the vaginal cuff and this tension/traction   was maintained by clamping these sutures on the left lower quadrant port.    Then, several interrupted figure-of-eight sutures  of 0 Vicryl on CT1 needles   were placed along the vaginal cuff in between the two aforementioned angle   sutures in the usual fashion, again always using intracorporeal suturing   technique and extracorporeal knot tying technique.  The vaginal cuff was thus   nicely and thoroughly closed in this fashion.  The pelvis was then copiously   irrigated and drained and hemostasis noted to be excellent.  Another remnant   of the right fallopian tube was seen and it was resected by serially   thoroughly cauterizing, sealing, and cutting the right mesosalpinx and this   was submitted as a specimen.  The pelvis was copiously irrigated and drained   again and again hemostasis was noted to be excellent.  Before the patient was   taken out of Trendelenburg position, thromboblast hemostasis agent was placed   over the vaginal cuff and right adnexa in order to help ensure continued   hemostasis.  The patient was taken out of Trendelenburg position.  The   laparoscope was directed cephalad and liver edge was seen and examined and   some changes of hepatic steatosis were appreciated when examining the liver   edge.  The patient was then taken out of Trendelenburg position.  Laparoscope   was removed and air was allowed to evacuate the peritoneal cavity.  All ports   were removed.  The fascia underneath the infraumbilical incision was   identified with use of S retractors, reapproximated with placement of a simple   interrupted suture using Vicryl.  The fascia underneath the suprapubic   incision was identified with use of S retractors, reapproximated with   placement of simple interrupted suture using Vicryl.  The fascia underneath   the left lower quadrant incision was identified with use of S retractors,   reapproximated with placement of simple interrupted suture using Vicryl.  The   fascia underneath the right lower quadrant incision was identified with use of   S retractors and reapproximated with placement of simple  interrupted suture   using Vicryl.  Skin incisions were all reapproximated by placing many   interrupted buried sutures of 4-0 Monocryl placed in the dermis.     The uterus was then removed from the vaginal vault and examined and when   examined, some evidence of the presence of intramural fibroids was   appreciated.  The uterus was appeared to be enlarged.  The uterus was   submitted as a specimen.  Toure catheter bulb was then deflated and the Toure   catheter was removed.  It should be noted that just upon closure of the   vaginal cuff, the anesthesiologist at my request gave the patient indigo   carmine intravenously.  At this time, the urine in the Toure catheter was   noted to be blue.  The Toure catheter bulb was deflated and the Toure catheter   was removed.  The cystoscope was inserted through the urethra into the   bladder and cystoscopy was performed.  The right ureteral os was then clearly   identified.  Then, a vigorous jet of blue urine was seen coming from the right   ureteral ostia indicating that the right ureter was patent.  Next, the left   ureteral os was clearly identified.  Then, a vigorous jet of blue urine was   seen coming from the left ureteral ostia indicating that the left ureter was   patent.  The dome of the bladder was then examined and found to be normal.    The cystoscope was removed.  The Toure catheter was then replaced.  Bimanual   vaginal exam was then performed and the vaginal cuff was palpated along its   entire length and found to be nicely reapproximated along its entire length   during palpation during bimanual exam.  The procedure was terminated.  Final   lap and needle counts reported to be correct x2 at the end of the procedure.    The patient tolerated the procedure well and sent to postanesthesia recovery   in stable condition.        ______________________________  MD MICHELLE Owens/NISREEN    DD:  03/14/2022 11:34  DT:  03/14/2022 12:37    Job#:   [id number]    CC:Merle Lee MD(User)  Don Sauer MD(User)

## 2022-03-14 NOTE — ANESTHESIA PROCEDURE NOTES
Airway    Date/Time: 3/14/2022 7:38 AM  Performed by: Don Sauer M.D.  Authorized by: Don Sauer M.D.     Location:  OR  Urgency:  Elective  Difficult Airway: No    Indications for Airway Management:  Anesthesia      Spontaneous Ventilation: absent    Sedation Level:  Deep  Preoxygenated: Yes    Patient Position:  Sniffing  Mask Difficulty Assessment:  0 - not attempted  Final Airway Type:  Endotracheal airway  Final Endotracheal Airway:  ETT  Cuffed: Yes    Technique Used for Successful ETT Placement:  Direct laryngoscopy    Insertion Site:  Oral  Blade Type:  Magnus  Laryngoscope Blade/Videolaryngoscope Blade Size:  3  ETT Size (mm):  7.0  Measured from:  Teeth  ETT to Teeth (cm):  22  Placement Verified by: auscultation and capnometry    Cormack-Lehane Classification:  Grade I - full view of glottis  Number of Attempts at Approach:  1

## 2022-03-14 NOTE — ANESTHESIA POSTPROCEDURE EVALUATION
Patient: Maggie Escobar    Procedure Summary     Date: 03/14/22 Room / Location: [facility] ROOM 23 / [facility]    Anesthesia Start: 0731 Anesthesia Stop: 1104    Procedures:       HYSTERECTOMY, LAPAROSCOPIC - TOTAL (Bilateral Abdomen)      SALPINGECTOMY (Bilateral Pelvis)      CYSTOSCOPY (Bilateral Bladder)      EXTENSIVE LYSIS, ADHESIONS, LAPAROSCOPIC (Bilateral Abdomen) Diagnosis: (MENORRHAGIA, UTERINE FIBROIDS)    Surgeons: Oleg Underwood M.D. Responsible Provider: Don Sauer M.D.    Anesthesia Type: general ASA Status: 3          Final Anesthesia Type: general  Last vitals  BP   Blood Pressure: 103/63    Temp   36.3 °C (97.4 °F)    Pulse   73   Resp   20    SpO2   94 %      Anesthesia Post Evaluation    Patient location during evaluation: PACU  Patient participation: complete - patient participated  Level of consciousness: awake and alert  Pain score: 2    Airway patency: patent  Anesthetic complications: no  Cardiovascular status: hemodynamically stable  Respiratory status: acceptable  Hydration status: euvolemic    PONV: none          No complications documented.     Nurse Pain Score: 0 (NPRS)

## 2022-03-14 NOTE — ANESTHESIA PREPROCEDURE EVALUATION
" Case: [id number] Date/Time: 03/14/22 0715    Procedures:       HYSTERECTOMY, LAPAROSCOPIC - TOTAL      SALPINGECTOMY (Bilateral )      CYSTOSCOPY    Pre-op diagnosis: MENORRHAGIA, UTERINE FIBROIDS    Location: [facility] ROOM 23 / SURGERY SAME DAY [facility]    Surgeons: Oleg Underwood M.D.          Relevant Problems   No relevant active problems     /63   Pulse 73   Temp 36.3 °C (97.4 °F) (Temporal)   Resp 20   Ht 1.702 m (5' 7\")   Wt 80 kg (176 lb 5.9 oz)   SpO2 94%   BMI 27.62 kg/m²     Physical Exam    Airway   Mallampati: II  TM distance: >3 FB  Neck ROM: full       Cardiovascular - normal exam  Rhythm: regular  Rate: normal  (-) murmur     Dental - normal exam           Pulmonary - normal exam  Breath sounds clear to auscultation     Abdominal    Neurological - normal exam                 Anesthesia Plan    ASA 3       Plan - general       Airway plan will be ETT          Induction: intravenous    Postoperative Plan: Postoperative administration of opioids is intended.    Pertinent diagnostic labs and testing reviewed    Informed Consent:    Anesthetic plan and risks discussed with patient.    Use of blood products discussed with: patient whom consented to blood products.         "

## 2022-03-14 NOTE — OR NURSING
1102: Patient from OR unresponsive via gurney to PACU. 6 L via mask to OPA. Abdominal sites (x4) with gauze and tegaderm dressing clean,dry and intact. Anastasiia pad dry. Report from the anesthesiologist and RN received. Patient's name and  verified.     1125: OPA DC'd.  Patient's respiration spontaneous and non-labored. No respiratory distress noted. ERAS started in PACU. 10L via oxymask. Patient is not fully awake.     1135: Patient reporting 9/10 lower abdominal pain and will medicate. Abdomen is soft.     1150: handoff to POLINA Oliveira

## 2022-03-14 NOTE — OR NURSING
1213: Assumed care. Patient continues to c/o pain and will medicate.     1227:  updated.     1235: Patient laying on her R side for comfort.     1248: VSS. Abdominal sites intact. No nausea. Patient's respiration spontaneous and non-labored. Patient re-position for comfort.     1250: Patient met criteria to transfer out of PACU.

## 2022-03-14 NOTE — OR NURSING
1150: assumed care from POLINA Alegre    1155: c/o 9/10 unbearable lower abdominal pain. 0.4mg IV dilaudid given.    1205: disposable heat packs placed to lower abdomen, barrier in place between skin    1213: handoff to POLINA Alegre

## 2022-03-14 NOTE — OR NURSING
1357 Report received from Codey FISH.     1429 Patient transferred to floor via patient transport via [facility] with personal belongings.

## 2022-03-15 PROBLEM — N85.2 ENLARGED UTERUS: Status: ACTIVE | Noted: 2022-03-15

## 2022-03-15 PROBLEM — N92.0 MENORRHAGIA: Status: ACTIVE | Noted: 2022-03-15

## 2022-03-15 PROBLEM — D25.9 LEIOMYOMA OF BODY OF UTERUS: Status: ACTIVE | Noted: 2022-03-15

## 2022-03-15 LAB
BASOPHILS # BLD AUTO: 0.4 % (ref 0–1.8)
BASOPHILS # BLD: 0.04 K/UL (ref 0–0.12)
EOSINOPHIL # BLD AUTO: 0.03 K/UL (ref 0–0.51)
EOSINOPHIL NFR BLD: 0.3 % (ref 0–6.9)
ERYTHROCYTE [DISTWIDTH] IN BLOOD BY AUTOMATED COUNT: 39.3 FL (ref 35.9–50)
HCT VFR BLD AUTO: 31.3 % (ref 37–47)
HGB BLD-MCNC: 10.1 G/DL (ref 12–16)
IMM GRANULOCYTES # BLD AUTO: 0.06 K/UL (ref 0–0.11)
IMM GRANULOCYTES NFR BLD AUTO: 0.5 % (ref 0–0.9)
LYMPHOCYTES # BLD AUTO: 1.25 K/UL (ref 1–4.8)
LYMPHOCYTES NFR BLD: 11.2 % (ref 22–41)
MCH RBC QN AUTO: 28.8 PG (ref 27–33)
MCHC RBC AUTO-ENTMCNC: 32.3 G/DL (ref 33.6–35)
MCV RBC AUTO: 89.2 FL (ref 81.4–97.8)
MONOCYTES # BLD AUTO: 0.52 K/UL (ref 0–0.85)
MONOCYTES NFR BLD AUTO: 4.6 % (ref 0–13.4)
NEUTROPHILS # BLD AUTO: 9.29 K/UL (ref 2–7.15)
NEUTROPHILS NFR BLD: 83 % (ref 44–72)
NRBC # BLD AUTO: 0 K/UL
NRBC BLD-RTO: 0 /100 WBC
PLATELET # BLD AUTO: 198 K/UL (ref 164–446)
PMV BLD AUTO: 9.5 FL (ref 9–12.9)
RBC # BLD AUTO: 3.51 M/UL (ref 4.2–5.4)
WBC # BLD AUTO: 11.2 K/UL (ref 4.8–10.8)

## 2022-03-15 PROCEDURE — 700102 HCHG RX REV CODE 250 W/ 637 OVERRIDE(OP): Performed by: SPECIALIST

## 2022-03-15 PROCEDURE — 700105 HCHG RX REV CODE 258: Performed by: SPECIALIST

## 2022-03-15 PROCEDURE — 85025 COMPLETE CBC W/AUTO DIFF WBC: CPT

## 2022-03-15 PROCEDURE — G0378 HOSPITAL OBSERVATION PER HR: HCPCS

## 2022-03-15 PROCEDURE — 36415 COLL VENOUS BLD VENIPUNCTURE: CPT

## 2022-03-15 PROCEDURE — A9270 NON-COVERED ITEM OR SERVICE: HCPCS | Performed by: SPECIALIST

## 2022-03-15 RX ORDER — OMEPRAZOLE 20 MG/1
20 CAPSULE, DELAYED RELEASE ORAL DAILY
Status: DISCONTINUED | OUTPATIENT
Start: 2022-03-15 | End: 2022-03-16 | Stop reason: HOSPADM

## 2022-03-15 RX ORDER — DOCUSATE SODIUM 100 MG/1
100 CAPSULE, LIQUID FILLED ORAL 2 TIMES DAILY
Qty: 60 CAPSULE | Refills: 0 | Status: SHIPPED | OUTPATIENT
Start: 2022-03-15

## 2022-03-15 RX ORDER — IBUPROFEN 800 MG/1
800 TABLET ORAL EVERY 8 HOURS
Status: DISCONTINUED | OUTPATIENT
Start: 2022-03-15 | End: 2022-03-16 | Stop reason: HOSPADM

## 2022-03-15 RX ORDER — FERROUS SULFATE 325(65) MG
325 TABLET ORAL DAILY
Qty: 30 TABLET | Refills: 0 | Status: SHIPPED | OUTPATIENT
Start: 2022-03-15

## 2022-03-15 RX ORDER — MORPHINE SULFATE 15 MG/1
15 TABLET ORAL EVERY 6 HOURS PRN
Qty: 28 TABLET | Refills: 0 | Status: SHIPPED | OUTPATIENT
Start: 2022-03-15 | End: 2022-03-22

## 2022-03-15 RX ORDER — IBUPROFEN 800 MG/1
800 TABLET ORAL EVERY 8 HOURS PRN
Qty: 30 TABLET | Refills: 0 | Status: SHIPPED | OUTPATIENT
Start: 2022-03-15

## 2022-03-15 RX ORDER — CALCIUM CARBONATE 500 MG/1
500 TABLET, CHEWABLE ORAL 3 TIMES DAILY PRN
Status: DISCONTINUED | OUTPATIENT
Start: 2022-03-15 | End: 2022-03-16 | Stop reason: HOSPADM

## 2022-03-15 RX ADMIN — IBUPROFEN 800 MG: 800 TABLET, FILM COATED ORAL at 01:56

## 2022-03-15 RX ADMIN — OMEPRAZOLE 20 MG: 20 CAPSULE, DELAYED RELEASE ORAL at 15:34

## 2022-03-15 RX ADMIN — MORPHINE SULFATE 15 MG: 15 TABLET ORAL at 02:55

## 2022-03-15 RX ADMIN — MORPHINE SULFATE 15 MG: 15 TABLET ORAL at 15:39

## 2022-03-15 RX ADMIN — ACETAMINOPHEN 1000 MG: 500 TABLET ORAL at 05:43

## 2022-03-15 RX ADMIN — DOCUSATE SODIUM 100 MG: 100 CAPSULE, LIQUID FILLED ORAL at 18:33

## 2022-03-15 RX ADMIN — IBUPROFEN 800 MG: 800 TABLET, FILM COATED ORAL at 15:36

## 2022-03-15 RX ADMIN — MORPHINE SULFATE 15 MG: 15 TABLET ORAL at 07:40

## 2022-03-15 RX ADMIN — METHADONE HYDROCHLORIDE 40 MG: 40 TABLET ORAL at 14:31

## 2022-03-15 RX ADMIN — DOCUSATE SODIUM 100 MG: 100 CAPSULE, LIQUID FILLED ORAL at 05:43

## 2022-03-15 RX ADMIN — MORPHINE SULFATE 15 MG: 15 TABLET ORAL at 11:14

## 2022-03-15 RX ADMIN — SODIUM CHLORIDE, POTASSIUM CHLORIDE, SODIUM LACTATE AND CALCIUM CHLORIDE: 600; 310; 30; 20 INJECTION, SOLUTION INTRAVENOUS at 01:56

## 2022-03-15 RX ADMIN — MORPHINE SULFATE 15 MG: 15 TABLET ORAL at 19:55

## 2022-03-15 RX ADMIN — METHADONE HYDROCHLORIDE 10 MG: 10 TABLET ORAL at 14:31

## 2022-03-15 RX ADMIN — ACETAMINOPHEN 1000 MG: 500 TABLET ORAL at 11:47

## 2022-03-15 RX ADMIN — ACETAMINOPHEN 1000 MG: 500 TABLET ORAL at 18:33

## 2022-03-15 NOTE — PROGRESS NOTES
"0712- Bedside report received from POLINA Tang.  Assumed care of patient.  0835- Patient assessment done.  Patient reported she is voiding without difficulty and passing flatus.  Patient denied dizziness and reported that she is able to walk around in her room.  Discussed pain management plan.  Reviewed plan of care.  Patient verbalized understanding.  1114- IV completed.  IV saline locked.    1139- Dr. Underwood notified that the patient reported pain rated between \"5 and 6\" out of 10, that the patient is getting up out of bed and able to ambulate to the bathroom and void, that patient is taking PO fluids.  Telephone order received from Dr. Underwood that LR may be discontinued.  1343- Update received from KRISTEN Bryson, that the patient was able to ambulate in hallways and tolerated well and is now back in bed.  1430- Patient reported that she tolerated walking in the hallways.  Patient reported that when she got back into her room that she felt \"a little dizzy, and felt cold and hot\".  Patient stated her pain \"feels worse\" and reported back pain and abdominal is a \"7\" after walking in the hallways.  Patient given heat packs.  Patient requested TUMS for c/o heartburn when she takes oral medication.  Patient reported feeling anxious about getting her outpatient medications and getting to appointments.  Patient denied any current thoughts of self harm.  Patient reported she has felt depressed recently but stated she does see a counselor once a month and is hoping to be able to see the counselor once a week. At 1450- Dr. Underwood given update.  Telephone order received to cancel discharge and to order a CBC for tomorrow morning.  Telephone order received to discontinue prn ibuprofen and to order ibuprofen 800 mg PO every 8 hours.  Telephone order received for TUMS 500 mg, PO, TID, prn, and Omeprazole 20 mg PO daily.  "

## 2022-03-15 NOTE — PROGRESS NOTES
The patient is today postoperative day number one status post total laparoscopic hysterectomy, extensive lysis of adhesions, bilateral salpingectomy, followed by cystoscopy.  The patient says she does have some abdominal soreness. She says she has been urinating and tolerating oral fluids and ambulating.  Initially she was given Percocet to treat her pain and found no relief with Percocet. However she has been given oral morphine 15 milligrams and this has significantly help relieve her pain.  Vital signs: the patient vital signs are stable and she is afebrile.  General: The patient appears well developed and well-nourished and relaxed and alert and comfortable and in no apparent distress.  Labs: the patient’s hemoglobin and hematocrit this morning or 10.1 grams per deciliter and 31.3 percent respectively. Her white blood count this morning was 11.2.  Assessment:  Postoperative day number one status post total laparoscopic hysterectomy, lysis of adhesions, bilateral salpingectomy, followed by cystoscopy. The patient is experiencing postoperative discomfort but appears to be recovering from her surgery appropriately.  Today’s labs do reveal mild anemia.  Plan:  I explained to the patient that I believe that we will be able to discharge her home later today. We will discharge her home with instructions and with prescriptions for oral morphine for about one week. She will continue to take her methadone. I explained to her that her labs this morning to reveal mild anemia and that I would like to have her take one iron pill per day for the next month along with stool softener’s and I will send into her prescriptions for these as well as for oral morphine and ibuprofen.  I asked her to follow up with me in the office in two weeks and to call or contact me at any time should she ever have any problems or questions or complaints and she said that she would do so.  Oleg Underwood M.D.

## 2022-03-15 NOTE — PROGRESS NOTES
0495- Telephone report received from Adam PACU RN.  4835- Patient arrived via gurney to Room S351, with O2 via oxygen mask at 10 L.  Upon arrival, patient reported she needed to void.  Patient able to get off of the gurney with hand held assist and patient able to ambulated to bathroom.  After several minutes, patient able to void.  Unable to measure void.  Patient encouraged to use ilya water bottle to rinse perineum after she voids.  Patient assisted to put on underwear and ilya-pad placed.  Patient then got into bed with standby assist.  Assessment done.  IV saline locked.  O2 via mask transferred from portable O2 tank to wall and set to 10 L.  Sequential stockings placed on.  4 small abdominal dressing sites are clean, dry, and intact.  No lochia noted.  2742- Patient reported she has been up to bathroom on her own and able to void.  Patient medicated for c/o lower abdominal pain per MD orders.

## 2022-03-15 NOTE — DISCHARGE PLANNING
:    Notified by RN that Pt is requesting a copy of her MAR to be emailed to [facility].  SW met with Pt who signed a EVE.  SW emailed Pt's MAR to [email or web address].    Provided Pt with information to [employer] Transportation program to assist with getting to her doctor's appointments.  Pt also stated [facility] has a program where they will drop off her methadone to her house daily while she is recovering.  Pt stated she receives a lot of support from Irene-Counselor at [facility].

## 2022-03-15 NOTE — CARE PLAN
The patient is Stable - Low risk of patient condition declining or worsening    Shift Goals  Clinical Goals:  (pain managment)    Progress made toward(s) clinical / shift goals:  educated on pain meds, pain scale and due times.     Patient is not progressing towards the following goals:

## 2022-03-15 NOTE — PROGRESS NOTES
"Assessment Complete. Call light with in reach. Educated on emergency light.     2126- Consulted with pharmacist on that pt is on 50mg methadone and 15 mg IR morphine. Asked if this combination is ok to f=give. States ok to give unless vital are not wnl.     2145- Notified Dr. Underwood that pt states \"it feels like I have a bladder infection\". Notified him that pt is having delayed urintating, a feeling of fullness in bladder, and frequency but no pain when urinating. No new orders received.   Also notified  That pt is on methadone and morphine. States ok to give  Asked   If ok to change Ibprofen to q8prn. States this is ok. Also notified of an additional prn pericolace order. States ok to D/c.       "

## 2022-03-15 NOTE — CARE PLAN
The patient is Stable - Low risk of patient condition declining or worsening    Shift Goals  Clinical Goals: Pain management; pt to ambulate    Progress made toward(s) clinical / shift goals:  Patient ambulated several times this shift from the bed to the bathroom with steady gait.  Patient using heat packs, ice packs, and reported pain relief with PO pain medication.

## 2022-03-16 VITALS
DIASTOLIC BLOOD PRESSURE: 94 MMHG | OXYGEN SATURATION: 97 % | BODY MASS INDEX: 27.68 KG/M2 | TEMPERATURE: 98.4 F | RESPIRATION RATE: 18 BRPM | HEIGHT: 67 IN | HEART RATE: 66 BPM | SYSTOLIC BLOOD PRESSURE: 131 MMHG | WEIGHT: 176.37 LBS

## 2022-03-16 PROBLEM — N80.03 ADENOMYOSIS: Status: ACTIVE | Noted: 2022-03-16

## 2022-03-16 LAB
ERYTHROCYTE [DISTWIDTH] IN BLOOD BY AUTOMATED COUNT: 40 FL (ref 35.9–50)
HCT VFR BLD AUTO: 30.8 % (ref 37–47)
HGB BLD-MCNC: 10 G/DL (ref 12–16)
MCH RBC QN AUTO: 29.2 PG (ref 27–33)
MCHC RBC AUTO-ENTMCNC: 32.5 G/DL (ref 33.6–35)
MCV RBC AUTO: 89.8 FL (ref 81.4–97.8)
PLATELET # BLD AUTO: 175 K/UL (ref 164–446)
PMV BLD AUTO: 9.8 FL (ref 9–12.9)
RBC # BLD AUTO: 3.43 M/UL (ref 4.2–5.4)
WBC # BLD AUTO: 7.3 K/UL (ref 4.8–10.8)

## 2022-03-16 PROCEDURE — 85027 COMPLETE CBC AUTOMATED: CPT

## 2022-03-16 PROCEDURE — G0378 HOSPITAL OBSERVATION PER HR: HCPCS

## 2022-03-16 PROCEDURE — A9270 NON-COVERED ITEM OR SERVICE: HCPCS | Performed by: SPECIALIST

## 2022-03-16 PROCEDURE — 36415 COLL VENOUS BLD VENIPUNCTURE: CPT

## 2022-03-16 PROCEDURE — 700102 HCHG RX REV CODE 250 W/ 637 OVERRIDE(OP): Performed by: SPECIALIST

## 2022-03-16 RX ORDER — METHADONE HYDROCHLORIDE 10 MG/1
10 TABLET ORAL ONCE
Status: COMPLETED | OUTPATIENT
Start: 2022-03-16 | End: 2022-03-16

## 2022-03-16 RX ORDER — METHADONE HYDROCHLORIDE 40 MG/1
40 TABLET ORAL DAILY
Status: DISCONTINUED | OUTPATIENT
Start: 2022-03-16 | End: 2022-03-16 | Stop reason: HOSPADM

## 2022-03-16 RX ORDER — MORPHINE SULFATE 15 MG/1
15 TABLET ORAL EVERY 4 HOURS PRN
Qty: 28 TABLET | Refills: 0 | Status: SHIPPED | OUTPATIENT
Start: 2022-03-16 | End: 2022-03-23

## 2022-03-16 RX ADMIN — MORPHINE SULFATE 15 MG: 15 TABLET ORAL at 00:03

## 2022-03-16 RX ADMIN — IBUPROFEN 800 MG: 800 TABLET, FILM COATED ORAL at 15:50

## 2022-03-16 RX ADMIN — MORPHINE SULFATE 15 MG: 15 TABLET ORAL at 05:38

## 2022-03-16 RX ADMIN — ACETAMINOPHEN 1000 MG: 500 TABLET ORAL at 05:38

## 2022-03-16 RX ADMIN — MORPHINE SULFATE 15 MG: 15 TABLET ORAL at 10:51

## 2022-03-16 RX ADMIN — ACETAMINOPHEN 1000 MG: 500 TABLET ORAL at 00:03

## 2022-03-16 RX ADMIN — IBUPROFEN 800 MG: 800 TABLET, FILM COATED ORAL at 07:56

## 2022-03-16 RX ADMIN — ACETAMINOPHEN 1000 MG: 500 TABLET ORAL at 12:15

## 2022-03-16 RX ADMIN — MORPHINE SULFATE 15 MG: 15 TABLET ORAL at 14:30

## 2022-03-16 RX ADMIN — IBUPROFEN 800 MG: 800 TABLET, FILM COATED ORAL at 00:03

## 2022-03-16 RX ADMIN — METHADONE HYDROCHLORIDE 10 MG: 10 TABLET ORAL at 14:56

## 2022-03-16 RX ADMIN — METHADONE HYDROCHLORIDE 40 MG: 40 TABLET ORAL at 12:15

## 2022-03-16 ASSESSMENT — PAIN DESCRIPTION - PAIN TYPE
TYPE: ACUTE PAIN
TYPE: ACUTE PAIN;SURGICAL PAIN
TYPE: ACUTE PAIN
TYPE: ACUTE PAIN

## 2022-03-16 NOTE — PROGRESS NOTES
"The patient is today postoperative day #2 status post total laparoscopic hysterectomy and bilateral salpingectomy followed by cystoscopy.  She tells me today that she feels much better today compared to yesterday.  She says that her pain is less today compared to yesterday.  She is ambulating and urinating and tolerating a regular diet.  She says she would like to go home this afternoon.  Vital signs: The patient's vital signs are stable and she is afebrile.  Her temperature this morning was 37 °C (98.6 °F).  Her heart rate this morning is 61 bpm and her respiratory rate is 18 breaths/min.  Her pulse ox is 98% on room air.  Her blood pressure this morning is 113/73.  General: The patient appears well-developed and well-nourished and relaxed and alert and comfortable and in no apparent distress.  Labs: The patient's hemoglobin globin this morning is 10.0 g/dL (her hemoglobin yesterday morning was 10.1 g/dL) and her hematocrit this morning is 30.8% (her hematocrit yesterday morning was 31.3%).  Her white blood count this morning was 7.3 (her white blood count yesterday morning was 11.2).  Pathology: The pathology report states \"myometrium with extensive adenomyosis\".  In the body of the pathology report the pathology report states that the uterus weighed 203 grams.  Assessment:  Postoperative day #2 status post total laparoscopic hysterectomy, bilateral salpingectomy, followed by cystoscopy.  The patient appears to be recovering appropriately.  Her pain is much less today compared to yesterday.  The patient is on methadone.  She normally takes methadone 47 mg daily.  Plan:  We will discharge the patient home today with prescriptions for analgesics and she has an appointment to follow-up with me in the office later this month for a postoperative visit.  She will continue to be seen at [facility] to continue her methadone.  I asked her to call or contact me at any time should she ever have any problems or " questions or complaints and she said that she would do so.  Oleg Underwood MD

## 2022-03-16 NOTE — PROGRESS NOTES
Discharge education given, all questions answered, Pt AO4, breathing normal, no signs of any distress., [facility]

## 2022-03-16 NOTE — CARE PLAN
The patient is Stable - Low risk of patient condition declining or worsening    Shift Goals  Clinical Goals:  (pain managment)    Progress made toward(s) clinical / shift goals:  educated on pain medications, due times, and pain scale    Patient is not progressing towards the following goals:

## 2022-03-16 NOTE — DISCHARGE PLANNING
:    SW emailed the latest MAR to [email or web address].  Pt provided a fax number to [employer]: [phone].  SW tried to fax the MAR several times using different fax machines but the fax would not go through.  Notified Pt and provided her a copy of the MAR.    Nothing further.    4:00 pm-Notified by RN that patient's ride is not able to come pick her up and Pt does not have a way to get home.  Provided Pt with cab voucher #[id number].  Nothing further.

## 2022-03-16 NOTE — CARE PLAN
Problem: Pain - Standard  Goal: Alleviation of pain or a reduction in pain to the patient’s comfort goal  Outcome: Progressing     Problem: Early Mobilization - Post Surgery  Goal: Early mobilization post surgery  Outcome: Progressing     Problem: Wound/ / Incision Healing  Goal: Patient's wound/surgical incision will decrease in size and heals properly  Outcome: Progressing     Problem: Respiratory  Goal: Patient will achieve/maintain optimum respiratory ventilation and gas exchange  Outcome: Progressing     The patient is Stable - Low risk of patient condition declining or worsening    Shift Goals  Clinical Goals: stable vs  Patient Goals: dc    Progress made toward(s) clinical / shift goals:    Pt reports comfort after pain interventions, incision CDI, VSS, educated on POC, needs met at this time. Questions answered. Will continue to educate.

## 2022-03-16 NOTE — PROGRESS NOTES
Assessment complete. Pt pain controlled. Surgical sights clean dry and intact. Call light at bedside

## 2022-03-16 NOTE — DISCHARGE INSTRUCTIONS
ACTIVITY: Rest and take it easy for the first 24 hours.  A responsible adult is recommended to remain with you during that time.  It is normal to feel sleepy.  We encourage you to not do anything that requires balance, judgment or coordination.    MILD FLU-LIKE SYMPTOMS ARE NORMAL. YOU MAY EXPERIENCE GENERALIZED MUSCLE ACHES, THROAT IRRITATION, HEADACHE AND/OR SOME NAUSEA.    FOR 24 HOURS DO NOT:  Drive, operate machinery or run household appliances.  Drink beer or alcoholic beverages.   Make important decisions or sign legal documents.    SPECIAL INSTRUCTIONS:  SEE ATTACHED HANDOUT    DIET: To avoid nausea, slowly advance diet as tolerated, avoiding spicy or greasy foods for the first day.  Add more substantial food to your diet according to your physician's instructions.  Babies can be fed formula or breast milk as soon as they are hungry.  INCREASE FLUIDS AND FIBER TO AVOID CONSTIPATION.    SURGICAL DRESSING/BATHING:     MAY SHOWER TOMORROW.    NO BATHS, HOT TUBS, OR ANY SUBMERSION UNTIL CLEARED FROM SURGEON    FOLLOW-UP APPOINTMENT:  A follow-up appointment should be arranged with your doctor; call to schedule.    You should CALL YOUR PHYSICIAN if you develop:  Fever greater than 101 degrees F.  Pain not relieved by medication, or persistent nausea or vomiting.  Excessive bleeding (blood soaking through dressing) or unexpected drainage from the wound.  Extreme redness or swelling around the incision site, drainage of pus or foul smelling drainage.  Inability to urinate or empty your bladder within 8 hours.  Problems with breathing or chest pain.    You should call 911 if you develop problems with breathing or chest pain.  If you are unable to contact your doctor or surgical center, you should go to the nearest emergency room or urgent care center.      Physician's telephone #: DR. [phone]    If any questions arise, call your doctor.  If your doctor is not available, please feel free to call the  [facility]urgical Center at [phone].     A registered nurse may call you a few days after your surgery to see how you are doing after your procedure.    MEDICATIONS: Resume taking daily medication.  Take prescribed pain medication with food.  If no medication is prescribed, you may take non-aspirin pain medication if needed.  PAIN MEDICATION CAN BE VERY CONSTIPATING.  Take a stool softener or laxative such as senokot, pericolace, or milk of magnesia if needed.    Prescription given for pain control.    Last pain medication given at 0800.    If your physician has prescribed pain medication that includes Acetaminophen (Tylenol), do not take additional Acetaminophen (Tylenol) while taking the prescribed medication.    Depression / Suicide Risk    As you are discharged from this [facility] facility, it is important to learn how to keep safe from harming yourself.    Recognize the warning signs:  · Abrupt changes in personality, positive or negative- including increase in energy   · Giving away possessions  · Change in eating patterns- significant weight changes-  positive or negative  · Change in sleeping patterns- unable to sleep or sleeping all the time   · Unwillingness or inability to communicate  · Depression  · Unusual sadness, discouragement and loneliness  · Talk of wanting to die  · Neglect of personal appearance   · Rebelliousness- reckless behavior  · Withdrawal from people/activities they love  · Confusion- inability to concentrate     If you or a loved one observes any of these behaviors or has concerns about self-harm, here's what you can do:  · Talk about it- your feelings and reasons for harming yourself  · Remove any means that you might use to hurt yourself (examples: pills, rope, extension cords, firearm)  · Get professional help from the community (Mental Health, Substance Abuse, psychological counseling)  · Do not be alone:Call your Safe Contact- someone whom you trust who will be there for  you.  · Call your local CRISIS HOTLINE 031-2207 or 227-027-4330  · Call your local Children's Mobile Crisis Response Team [place] (750) 144-4964 or www.benchee  · Call the toll free National Suicide Prevention Hotlines   · National Suicide Prevention Lifeline 566-004-LZPU (6453)  · [employer] Hope Line Network 800-SUICIDE (140-5595)        Managing Post-Op Pain at Home  Pain is expected after surgery. Know that you have a right to have this pain controlled. Managing pain helps you recover faster. Less pain means you can be active sooner. It also means less stress on the body and mind, which will help your body heal. When you go home after surgery, including same-day surgery, you will take charge of your pain management.   What is post-op pain?  Pain after surgery (post-op pain) is normal. How much pain you feel depends on the surgery. Your use of pain medicines and your sensitivity to pain are also factors. Each person feels pain differently. So try not to compare your pain with someone else’s. Your healthcare team will need to know how you are feeling. Be honest. If you are in pain, say so.   Measuring your pain  A pain scale helps you rate pain intensity (how strong you feel the pain is). On the scale, 0 means no pain, and 10 is the worst pain possible. Pain scales are not used to compare your pain with another person's pain. A pain scale is used only to measure your pain and how it changes for you. You should rate your pain every few hours. You may feel some pain even with medicines. It's important to tell your healthcare provider if medicines don't reduce the pain. Be sure to mention if the pain suddenly increases or changes.       Your recovery  If you have had same-day surgery, you may feel groggy or tired from the medicines given during surgery for your first hours at home. As pain management methods used during surgery wear off, pain may increase. So be sure not to skip a dose of prescribed  medicine. In fact, set an alarm or have someone remind you when it’s time to take your medicine. During this time, try to rest, even if you feel pretty good. Within the first 24 hours, a nurse or other healthcare provider is likely to call and ask how you’re doing.   Medicines for pain  Medicines can help to block pain, limit swelling, and control related problems. You may be given more than one medicine to treat your pain. Medicines may be changed as you feel better, or if they cause side effects.    Pain medicine can be given in several ways: by injection, by mouth, as a patch, cream, or ointment, or as a suppository.   Medicines  What they do  Possible side effects    Non-steroidal anti-inflammatory drugs (NSAIDs)  Reduce mild to moderate pain. They also help reduce swelling.  Nausea, stomach and digestive problems, and kidney and liver problems. Certain NSAIDs may increase the risk for cardiovascular disease in some people.    Opioids (morphine and similar medicines often called narcotics)  Reduce moderate to severe pain  Nausea, vomiting, itching, drowsiness, constipation, slowed or shallow breathing    Other pain relievers (analgesics)  Reduce mild to severe pain  Constipation, nausea, dizziness, drowsiness, kidney and liver problems    Seizure medicines (anticonvulsants)  Manage nerve-related pain  Drowsiness, dizziness, liver problems    Medicines for depression (antidepressants)  Manage chronic pain  Dry mouth, drowsiness, dizziness, constipation    Non-medicine pain relief  Medicines are not the only way to manage pain after surgery.   You can use ice to help reduce swelling and pain. Use a cold pack or bag of ice cubes wrapped in a thin cloth. Never put ice directly on your skin. Use the ice for up to 20 minutes at a time every 3 to 4 hours.    You can also reduce swelling and pain by keeping the operated area above the level of your heart if you can. This helps blood and other fluids drain from the  area.    You can also use relaxation to reduce pain. Try these techniques:  · Visualization or guided imagery. You picture yourself in a quiet, peaceful place to help take your mind off the pain.   · Progressive body relaxation. Starting at your feet, you clench and release your muscles. Work up your body slowly until you reach your neck and face.   · Deep breathing. You breathe in deeply and hold your breath for a few seconds. Then exhale slowly to help relax your body.    Tips for controlling pain  · Give pain medicine time to work. Most pain relievers taken by mouth need at least 20 to 30 minutes to take effect. They may not reach their maximum effect for close to an hour.    · Take pain medicine at regular times as directed. Don’t wait until the pain gets bad to take it.   · Get plenty of rest. Taking your medicine at night may help you get a good night’s rest.   · If pain lessens, try taking your medicine less often or in smaller doses.    Safety tips for taking pain medicines  · If you are worried about becoming addicted or have a history of substance abuse, talk with your healthcare provider before surgery. An open discussion can protect you and ensure effective pain management strategies.   · Let your provider know all of the medicines you're taking. Pain medicines can be dangerous when used with other medicines. For example, don't take opioids with benzodiazepines, such as aprazolam or lorazepam. Doing so can cause serious health problems. These include extreme sleepiness, slowed breathing, and death.   · Ask your pharmacist if you need to take the medicine with food or milk to avoid an upset stomach.   · Don’t break, crush, or cut in half any long-acting medicines. This could be harmful.   · Don’t take more medicine than directed. If your pain isn’t relieved, call your healthcare provider.   · Try to time your medicine so that you take it before starting an activity, such as dressing or sitting at the  table for dinner.   · Constipation is a common side effect with some pain medicines. Eating fruit, vegetables, and other foods high in fiber may help. Also drink plenty of fluids. Your provider may recommend a stool softener.   · Don’t drink alcohol while you are taking pain medicine. This can make you dizzy and slow your breathing. It can even be fatal.   · Don’t drive if you are taking opioid medicines.     When to call your healthcare provider  Call your healthcare provider right away if:  · Your pain is not relieved or if it gets worse  · You can't take your pain medicines as prescribed  · You have severe side effects like breathing problems, trouble waking up, dizziness, confusion, or severe constipation         Taking Opioid Medicines  For your health and safety, it’s important to take opioids exactly as directed. This helps make sure they work as they should. It also lowers the chances of side effects and the risk for taking too high a dose ( overdose). Each opioid medicine is different and has its own instructions for use. Your healthcare provider will help you understand the ones you’re prescribed and how to take them. If you have questions or concerns, be sure to talk about them with your healthcare provider.    Using opioids safely  Opioids can work very well to ease pain. But taking too much, taking them too long, or not taking them the right way can be harmful. To help reduce the risks to your health, be sure to follow these safety tips:   · Know if you are supposed to take the medicine on a regular basis or only as needed.  · If your medicine is taken on a regular basis, take it on time and in the right dose. If you miss a dose, don’t double up the next dose.   · Use a medicine log, ella, or calendar to keep track of when you take your medicine. This helps you stay on schedule and avoid missing doses or taking extra doses.   · When taking liquid doses of opioids, use a measuring spoon or dropper. This  way you can be sure to get the correct dose.   · Report any side effects that you have to your healthcare provider right away.  · Don’t cut, crush, or alter your medicine in any way.  · Don’t take someone else’s opioids or share yours with others.  · Don’t drive or use dangerous equipment or power tools while taking opioids.  · Check expiration dates regularly. Throw out any  medicines properly.    Beware of medicine interactions  Certain medicines can be dangerous, even fatal, when used with opioids. That’s why it’s important tell your healthcare provider and pharmacist about all the medicines you’re taking. This includes over-the-counter medicines, herbal remedies, supplements, and even illegal or street drugs. Medicines that may be unsafe to use with opioids include:   · Other over-the-counter pain relievers, such as acetaminophen  · Other prescription opioids  · Benzodiazepines (clonazepam, alprazolam, or other like medicines)   · Muscle relaxants (cyclobenzaprine, carisoprodol, or other like medicines)   · Hypnotics (sleep aids like zolpidem or other like medicines)   Warning:  Never combine opioids with alcohol or street drugs. This can be fatal.   Symptoms of opioid overdose  Opioids affect the part of the brain that controls breathing. An overdose of opioids can slow breathing down too much and even stop a person’s breathing. This can be fatal. Call 911 right away if an overdose is suspected in any person.   Three key symptoms to look for are:   · Narrowing of dark circles in the middle of eyes (pinpoint pupils)  · Breathing that has slowed or stopped   · Unconsciousness. This is when a person passes out and does not respond.  Other symptoms to look for include:   · Limp body  · Pale face  · Clammy skin  · Purple or blue color lips and fingernails  · Vomiting  Storing opioids safely  Opioids need to be stored safely. This helps protect others (including adults and children) from accidentally taking  the medicine. It also helps prevent the theft and misuse of the medicine. If possible, store the medicine in a locked container or cupboard that others cannot access. Store the medicine in a cool dry place. Don't use bathrooms, if possible. Always put the medicine back in its secure place after each use.    Disposing opioids  Unused or  opioids must be thrown away properly to prevent harm. Don’t save your medicine or give it to others for any reason. Even a single dose of opioids can lead to death if it used by someone other than who the medicine is prescribed for. To dispose of your medicine safely:   · Find your [facility]’s medicine take-back program. This may involve dropping off the medicine at a local police station or pharmacy.   · Some pharmacies also have mail-back programs. This often involves sending the medicine through the mail using a special medicine disposal envelope.   If these options are not available to you, ask your healthcare provider for help.  FDA guidelines for disposal   The FDA also has guidelines for flushing opioids down the toilet or disposing them in the trash. You can learn more at the following website: www.fda.gov/consumers/consumer-updates/where-and-how-vpyafsj-qzzhcz-peeenycnf. Always check with your local water and waste management company to find out if flushing opioids is allowed in your city or state.    Stopping opioid treatment  If you have been taking an opioid for more than a few weeks, your body gets used to having it. When you stop taking the medicine, withdrawal symptoms may develop that range from mild to severe. The list of possible withdrawal symptoms is very long. They can include:   · Restlessness and anxiety  · Muscle aches  · Sweating  · Dilated pupils  · Watery eyes  · Runny nose  · Problems sleeping  · Nausea or vomiting  · Abdominal cramping  · Diarrhea  · Rapid heartbeat  To stop opioid treatment safely and to help manage withdrawal symptoms, you will  need help from your healthcare provider. In most cases, the amount of medicine you take will be cut down. You will be weaned off the medicine slowly over several weeks. If needed, other medicines and treatments may also be used to help with this process. As the opioid medicine clears from your system, your body will readjust to not having it. Withdrawal symptoms should then go away. How long this takes can vary for every person.

## 2022-03-17 NOTE — DISCHARGE PLANNING
:    Received a phone call from Pt who discharged home yesterday.  Pt stated she tried to fill her prescription for morphine but was told by her pharmacy ([facility]) that a prior authorization is needed.  Pt stated she tried calling Dr. Underwood but was told by his staff that he was not on call.  Pt stated the staff was not very helpful so she decided to call me.  Pt also stated she was given the correct fax number to [facility] and would like her MAR to be faxed to [phone] Attn: Stacy.    ALE explained that she should call Dr. Underwood's office again and explain the situation and tell them to page the on-call doctor.  ALE also reached out to Mariama- to assist with the prior auth.  ALE faxed MAR to fax number Pt provided ([phone]) and it is not a fax number, it's the phone number to [facility].  Called Pt back and notified her that the fax number they gave her is a phone number.  Pt upset and stated she would have to call [facility] in the morning to get their correct fax number.

## 2022-03-17 NOTE — DISCHARGE PLANNING
Received call from ALE stating patient required medication prior auth for Morphine IR 15mg tabs.   provided [facility] pharmacy number [phone] and [employer] prior auth number [id number].  Called [employer] and spoke with Jacobo who stated to call [phone].  Called and spoke with Britta who states prior auth is unable to be started over the phone and a form needs to be filled out and faxed back.      Obtained form, which requires MD signature.  Called and spoke with Dr. Underwood who states he is home for the evening and does not have a fax to sign form.  Dr. Underwood will be in his office early tomorrow morning.  Faxed form to his office and awaiting signature.  Marked request as urgent, which can take up to 24 hours once submitted.  CM to follow up first thing in the morning for MD signature to submit PA form to insurance.

## 2022-03-18 NOTE — DISCHARGE PLANNING
Received faxed back [facility] medication prior auth form signed by Dr. Underwood.  Faxed to [facility] at [phone]. cm to continue to follow for determination.

## 2022-03-18 NOTE — DISCHARGE PLANNING
:    Received a message from Maggie asking that I email the MAR to [email or web address] and also fax it to [phone].  ALE emailed and faxed MAR.      Notified by Mariama that Pt's prior auth went through and Pt's morphine will be ready for  after 1 pm today.  ALE attempted to contact Pt to let her know, however her voicemail box is full.  ALE will continue to try to reach Pt.    2:45 pm-Spoke with Pt who stated [facility] received the email and fax and she was able to get her dose of methadone.  She was also aware that the prior authorization went through for her morphine and will be able to  her morphine after 3 pm today.  No further needs identified by Pt.

## 2022-03-18 NOTE — DISCHARGE PLANNING
Received faxed approval from [facility] for Morphine.  Called [facility] pharmacy and spoke with Sotero who ran the medication and it went through.  Sotero states they have the medication on hand and he will start filling it and it should be ready for pick-up at 1300.  Updated Jerrica AGUILERA who states she will call and let the patient know.

## 2022-03-31 NOTE — DISCHARGE SUMMARY
DATE OF ADMISSION:  03/14/2022   DATE OF DISCHARGE:  03/16/2022     ADMISSION DIAGNOSES:  1.  Menorrhagia.  2.  Uterine enlargement.  3.  Previous ultrasound revealed evidence of uterine fibroids.  4.  It is possible that the patient has adenomyosis, which certainly would   explain her symptoms of menorrhagia and findings of uterine enlargement.     DISCHARGE DIAGNOSES:  1.  Menorrhagia.  2.  Uterine enlargement.  3.  Previous ultrasound revealed evidence of uterine fibroids.  4.  It is possible that the patient has adenomyosis, which certainly would   explain her symptoms of menorrhagia and findings of uterine enlargement.  5.  Intraperitoneal adhesions of the omentum to the anterior aspect of the   pelvis and to the anterior abdominal wall.  6.  Right ovarian cyst, which was incised and drained.     PROCEDURES:  Laparoscopy, diagnostic and operative, with laparoscopic lysis of   adhesions (extensive) and total laparoscopic hysterectomy and bilateral   salpingectomy, followed by cystoscopy.     COMPLICATIONS:  None.     HOSPITAL COURSE:  The patient was admitted to [facility]   with the aforementioned admission diagnoses and on that day, namely on   03/14/2022, underwent laparoscopy, diagnostic and operative with laparoscopic   lysis of adhesions (extensive) and total laparoscopic hysterectomy and   bilateral salpingectomy followed by cystoscopy.  The procedure was without   complications.  The patient's postoperative course was uncomplicated.  Of   note, her preoperative hemoglobin was 12.8 g/dL and her postoperative   hemoglobin on postoperative day #1 was 10.1 g/dL and then the next day,   03/16/2022, postoperative day #2, her hemoglobin was 10.0 g/dL.     On 03/15/2022, on postoperative day #1, the patient said that she had some   abdominal soreness and said that she had been urinating and tolerating oral   fluids and ambulating.  Initially, the patient was given Percocet to treat her   pain  and found no relief with the Percocet.  However, she was given morphine   15 mg and this did relieve her pain.  Her vital signs on postoperative day #1   was stable and she was afebrile and she appeared well developed and well   nourished and relaxed and alert and comfortable and in no apparent distress.    Labs as mentioned revealed mild anemia, which appeared to be asymptomatic.  Of   note, she had been on methadone and her methadone was continued in the   hospital.     The next day, on 03/16/2022, on postoperative day #2, the patient said that   she felt much better compared to the previous day and said that her pain was   much less compared to the previous day and she was ambulating and urinating   and tolerating a regular diet and said she wanted to go home that afternoon   and her vital signs continued to be stable and she continued to be afebrile   and her postop pulse oximetry was 98% on room air. Her blood pressure in the   morning was 113/73.  She appeared well developed and well nourished and   relaxed and alert and comfortable and in no apparent distress.  Her hemoglobin   was 10.0 g/dL not significantly changed from the previous day.  Of note, the   pathology report stated myometrium with extensive adenomyosis and in the body   of the pathology report, the pathology report stated that her uterus weighed   203 grams, which is indeed enlarged.  She was discharged home on that day,   03/16/2022, postoperative day #2, with prescriptions for analgesics and   appointment to follow up with me in the office later in the month for   postoperative visit and I asked her to call or contact me at any time should   she ever have any problems or questions or complaints and she said that she   would do so.  She said she would follow up at the [facility] to   continue her methadone maintenance.        ______________________________  Oleg Underwood MD    MED/[facility]    DD:  03/31/2022 13:16  DT:  03/31/2022  13:42    Job#:  [id number]

## 2023-08-22 ENCOUNTER — TELEPHONE (OUTPATIENT)
Dept: HEALTH INFORMATION MANAGEMENT | Facility: OTHER | Age: 53
End: 2023-08-22
Payer: MEDICAID

## 2023-08-22 ENCOUNTER — TELEPHONE (OUTPATIENT)
Dept: CARDIOLOGY | Facility: MEDICAL CENTER | Age: 53
End: 2023-08-22
Payer: MEDICAID

## 2024-03-15 ENCOUNTER — HOSPITAL ENCOUNTER (EMERGENCY)
Facility: MEDICAL CENTER | Age: 54
End: 2024-03-15
Attending: EMERGENCY MEDICINE
Payer: MEDICAID

## 2024-03-15 ENCOUNTER — APPOINTMENT (OUTPATIENT)
Dept: RADIOLOGY | Facility: MEDICAL CENTER | Age: 54
End: 2024-03-15
Attending: EMERGENCY MEDICINE
Payer: MEDICAID

## 2024-03-15 VITALS
RESPIRATION RATE: 17 BRPM | BODY MASS INDEX: 27.54 KG/M2 | OXYGEN SATURATION: 93 % | HEART RATE: 77 BPM | TEMPERATURE: 98.5 F | WEIGHT: 175.49 LBS | HEIGHT: 67 IN | SYSTOLIC BLOOD PRESSURE: 118 MMHG | DIASTOLIC BLOOD PRESSURE: 58 MMHG

## 2024-03-15 DIAGNOSIS — N39.0 ACUTE UTI: ICD-10-CM

## 2024-03-15 DIAGNOSIS — R53.1 WEAKNESS: ICD-10-CM

## 2024-03-15 DIAGNOSIS — U07.1 COVID-19 VIRUS INFECTION: ICD-10-CM

## 2024-03-15 LAB
ALBUMIN SERPL BCP-MCNC: 4.7 G/DL (ref 3.2–4.9)
ALBUMIN/GLOB SERPL: 1.6 G/DL
ALP SERPL-CCNC: 87 U/L (ref 30–99)
ALT SERPL-CCNC: 22 U/L (ref 2–50)
ANION GAP SERPL CALC-SCNC: 13 MMOL/L (ref 7–16)
APPEARANCE UR: CLEAR
AST SERPL-CCNC: 30 U/L (ref 12–45)
BACTERIA #/AREA URNS HPF: ABNORMAL /HPF
BASOPHILS # BLD AUTO: 1.4 % (ref 0–1.8)
BASOPHILS # BLD: 0.05 K/UL (ref 0–0.12)
BILIRUB SERPL-MCNC: 0.5 MG/DL (ref 0.1–1.5)
BILIRUB UR QL STRIP.AUTO: NEGATIVE
BUN SERPL-MCNC: 9 MG/DL (ref 8–22)
CALCIUM ALBUM COR SERPL-MCNC: 8.5 MG/DL (ref 8.5–10.5)
CALCIUM SERPL-MCNC: 9.1 MG/DL (ref 8.5–10.5)
CHLORIDE SERPL-SCNC: 102 MMOL/L (ref 96–112)
CO2 SERPL-SCNC: 24 MMOL/L (ref 20–33)
COLOR UR: YELLOW
CREAT SERPL-MCNC: 0.9 MG/DL (ref 0.5–1.4)
EKG IMPRESSION: NORMAL
EOSINOPHIL # BLD AUTO: 0.03 K/UL (ref 0–0.51)
EOSINOPHIL NFR BLD: 0.8 % (ref 0–6.9)
EPI CELLS #/AREA URNS HPF: ABNORMAL /HPF
ERYTHROCYTE [DISTWIDTH] IN BLOOD BY AUTOMATED COUNT: 39 FL (ref 35.9–50)
FLUAV RNA SPEC QL NAA+PROBE: NEGATIVE
FLUBV RNA SPEC QL NAA+PROBE: NEGATIVE
GFR SERPLBLD CREATININE-BSD FMLA CKD-EPI: 77 ML/MIN/1.73 M 2
GLOBULIN SER CALC-MCNC: 2.9 G/DL (ref 1.9–3.5)
GLUCOSE SERPL-MCNC: 81 MG/DL (ref 65–99)
GLUCOSE UR STRIP.AUTO-MCNC: NEGATIVE MG/DL
HCT VFR BLD AUTO: 40.6 % (ref 37–47)
HGB BLD-MCNC: 14.1 G/DL (ref 12–16)
HYALINE CASTS #/AREA URNS LPF: ABNORMAL /LPF
IMM GRANULOCYTES # BLD AUTO: 0 K/UL (ref 0–0.11)
IMM GRANULOCYTES NFR BLD AUTO: 0 % (ref 0–0.9)
KETONES UR STRIP.AUTO-MCNC: ABNORMAL MG/DL
LEUKOCYTE ESTERASE UR QL STRIP.AUTO: ABNORMAL
LYMPHOCYTES # BLD AUTO: 0.95 K/UL (ref 1–4.8)
LYMPHOCYTES NFR BLD: 26.1 % (ref 22–41)
MCH RBC QN AUTO: 30.1 PG (ref 27–33)
MCHC RBC AUTO-ENTMCNC: 34.7 G/DL (ref 32.2–35.5)
MCV RBC AUTO: 86.8 FL (ref 81.4–97.8)
MICRO URNS: ABNORMAL
MONOCYTES # BLD AUTO: 0.35 K/UL (ref 0–0.85)
MONOCYTES NFR BLD AUTO: 9.6 % (ref 0–13.4)
NEUTROPHILS # BLD AUTO: 2.26 K/UL (ref 1.82–7.42)
NEUTROPHILS NFR BLD: 62.1 % (ref 44–72)
NITRITE UR QL STRIP.AUTO: NEGATIVE
NRBC # BLD AUTO: 0 K/UL
NRBC BLD-RTO: 0 /100 WBC (ref 0–0.2)
PH UR STRIP.AUTO: 5.5 [PH] (ref 5–8)
PLATELET # BLD AUTO: 202 K/UL (ref 164–446)
PMV BLD AUTO: 10.4 FL (ref 9–12.9)
POTASSIUM SERPL-SCNC: 3.8 MMOL/L (ref 3.6–5.5)
PROT SERPL-MCNC: 7.6 G/DL (ref 6–8.2)
PROT UR QL STRIP: NEGATIVE MG/DL
RBC # BLD AUTO: 4.68 M/UL (ref 4.2–5.4)
RBC # URNS HPF: ABNORMAL /HPF
RBC UR QL AUTO: ABNORMAL
RSV RNA SPEC QL NAA+PROBE: NEGATIVE
SARS-COV-2 RNA RESP QL NAA+PROBE: DETECTED
SODIUM SERPL-SCNC: 139 MMOL/L (ref 135–145)
SP GR UR STRIP.AUTO: 1.02
TROPONIN T SERPL-MCNC: 6 NG/L (ref 6–19)
TSH SERPL DL<=0.005 MIU/L-ACNC: 2.34 UIU/ML (ref 0.38–5.33)
UROBILINOGEN UR STRIP.AUTO-MCNC: 0.2 MG/DL
WBC # BLD AUTO: 3.6 K/UL (ref 4.8–10.8)
WBC #/AREA URNS HPF: ABNORMAL /HPF

## 2024-03-15 PROCEDURE — 93005 ELECTROCARDIOGRAM TRACING: CPT

## 2024-03-15 PROCEDURE — 84484 ASSAY OF TROPONIN QUANT: CPT

## 2024-03-15 PROCEDURE — 80053 COMPREHEN METABOLIC PANEL: CPT

## 2024-03-15 PROCEDURE — 700102 HCHG RX REV CODE 250 W/ 637 OVERRIDE(OP): Mod: UD | Performed by: EMERGENCY MEDICINE

## 2024-03-15 PROCEDURE — A9270 NON-COVERED ITEM OR SERVICE: HCPCS | Mod: UD | Performed by: EMERGENCY MEDICINE

## 2024-03-15 PROCEDURE — 0241U HCHG SARS-COV-2 COVID-19 NFCT DS RESP RNA 4 TRGT ED POC: CPT

## 2024-03-15 PROCEDURE — 71045 X-RAY EXAM CHEST 1 VIEW: CPT

## 2024-03-15 PROCEDURE — 93005 ELECTROCARDIOGRAM TRACING: CPT | Performed by: EMERGENCY MEDICINE

## 2024-03-15 PROCEDURE — 36415 COLL VENOUS BLD VENIPUNCTURE: CPT

## 2024-03-15 PROCEDURE — 81001 URINALYSIS AUTO W/SCOPE: CPT

## 2024-03-15 PROCEDURE — 84443 ASSAY THYROID STIM HORMONE: CPT

## 2024-03-15 PROCEDURE — 99285 EMERGENCY DEPT VISIT HI MDM: CPT

## 2024-03-15 PROCEDURE — 85025 COMPLETE CBC W/AUTO DIFF WBC: CPT

## 2024-03-15 RX ORDER — SULFAMETHOXAZOLE AND TRIMETHOPRIM 800; 160 MG/1; MG/1
1 TABLET ORAL EVERY 12 HOURS
Qty: 6 TABLET | Refills: 0 | Status: ACTIVE | OUTPATIENT
Start: 2024-03-15 | End: 2024-03-18

## 2024-03-15 RX ORDER — ONDANSETRON 4 MG/1
4 TABLET, ORALLY DISINTEGRATING ORAL EVERY 8 HOURS PRN
Qty: 10 TABLET | Refills: 0 | Status: SHIPPED | OUTPATIENT
Start: 2024-03-15 | End: 2024-03-15

## 2024-03-15 RX ORDER — SULFAMETHOXAZOLE AND TRIMETHOPRIM 800; 160 MG/1; MG/1
1 TABLET ORAL ONCE
Status: COMPLETED | OUTPATIENT
Start: 2024-03-15 | End: 2024-03-15

## 2024-03-15 RX ORDER — ONDANSETRON 4 MG/1
4 TABLET, ORALLY DISINTEGRATING ORAL EVERY 8 HOURS PRN
Qty: 10 TABLET | Refills: 0 | Status: SHIPPED | OUTPATIENT
Start: 2024-03-15

## 2024-03-15 RX ADMIN — SULFAMETHOXAZOLE AND TRIMETHOPRIM 1 TABLET: 800; 160 TABLET ORAL at 19:50

## 2024-03-15 ASSESSMENT — FIBROSIS 4 INDEX: FIB4 SCORE: 1.72

## 2024-03-15 NOTE — ED TRIAGE NOTES
"  Chief Complaint   Patient presents with    Weakness     Weakness ongoing for past 2 weeks. Last night symptoms became so severe she could not walk around her house. Hx of hypothyroidism.        Patient to triage via wheelchair due to weakness,  AAOx4, Appropriate precautions in place.     Explained wait time and triage process. Placed back in ED lobby. Told to notify ED tech or RN of any changes, verbalized understanding.    /73   Pulse (!) 101   Temp 36.4 °C (97.6 °F) (Temporal)   Resp 18   Ht 1.702 m (5' 7\")   Wt 79.6 kg (175 lb 7.8 oz)   SpO2 98%   BMI 27.49 kg/m²        "

## 2024-03-15 NOTE — ED NOTES
Provided gown for pt. Connected to continuous monitors. PIV established and labs sent. POC discussed. Call light within reach.

## 2024-03-15 NOTE — ED PROVIDER NOTES
"  ER Provider Note    Scribed for Sotero Crews M.d. by Jeniffer Saleem. 3/15/2024  3:26 PM    Primary Care Provider: Pcp Pt States None    CHIEF COMPLAINT  Chief Complaint   Patient presents with    Weakness     Weakness ongoing for past 2 weeks. Last night symptoms became so severe she could not walk around her house. Hx of hypothyroidism.      LIMITATION TO HISTORY   Select: : None    HPI/ROS  OUTSIDE HISTORIAN(S):  None    EXTERNAL RECORDS REVIEWED  Other The patient has a history of shortness of breath and shortness of breath. She was seen at [facility] in May, and saw her cardiologist in September.      Maggie Escobar is a 53 y.o. female who presents to the ED with a history of hypothyroidism/shortness of breath for weakness onset last night. She states she attempted to exert herself and make dinner for her children last night when she noticed a burning sensation in her bones, and felt like she was going to pass out. She states she had her son carry her to bed, and she felt as though she was unable to speak due to the weakness. The patient describes additional symptoms such as weakness across the whole body, headache, productive cough, swollen lymph nodes, sore throat, nausea, fever. She states she has long haul Covid and over the span of the last three years, she has lost 80% of her hair.  The patient feels like she is unable to formulate proper sentences and feels hyper, and denies being on drugs.  Now, she states her bowel movements are \"small little balls\", and feels \"backed up\". The patient states she has chest pain bilaterally and describes it as a cramping sensation. The patient states she was addicted to pain medication for 6 years, although is not any longer. The patient denies vomiting, painful urination, or episodes of diarrhea. She states she has not had any travel outside of the [place] recently. The patient is allergic to Penicillins.     PAST MEDICAL HISTORY  Past Medical History: "   Diagnosis Date    Fibroid uterus        SURGICAL HISTORY  Past Surgical History:   Procedure Laterality Date    OK CYSTOURETHROSCOPY Bilateral 3/14/2022    Procedure: CYSTOSCOPY;  Surgeon: Oleg Underwood M.D.;  Location: SURGERY SAME DAY [facility];  Service: Gynecology    HYSTERECTOMY LAPAROSCOPY Bilateral 3/14/2022    Procedure: HYSTERECTOMY, LAPAROSCOPIC - TOTAL;  Surgeon: Oleg Underwood M.D.;  Location: SURGERY SAME DAY [facility];  Service: Gynecology    SALPINGECTOMY Bilateral 3/14/2022    Procedure: SALPINGECTOMY;  Surgeon: Oleg Underwood M.D.;  Location: SURGERY SAME DAY [facility];  Service: Gynecology    LAPAROSCOPIC LYSIS OF ADHESIONS Bilateral 3/14/2022    Procedure: EXTENSIVE LYSIS, ADHESIONS, LAPAROSCOPIC;  Surgeon: Oleg Underwood M.D.;  Location: SURGERY SAME DAY [facility];  Service: Gynecology    OTHER ORTHOPEDIC SURGERY      L lateral meniscus repair       FAMILY HISTORY  History reviewed. No pertinent family history.    SOCIAL HISTORY   reports that she has never smoked. She has never used smokeless tobacco. She reports that she does not drink alcohol and does not use drugs.    CURRENT MEDICATIONS  Previous Medications    ALPRAZOLAM (XANAX) 1 MG TAB    Take 1 mg by mouth 3 times a day as needed for Sleep.    DESONIDE (DESOWEN) 0.05 % OINTMENT    Apply 1 Each topically 2 times a day.    DOCUSATE SODIUM (COLACE) 100 MG CAP    Take 1 Capsule by mouth 2 times a day.    FERROUS SULFATE 325 (65 FE) MG TABLET    Take 1 Tablet by mouth every day.    IBUPROFEN (MOTRIN) 800 MG TAB    Take 1 Tablet by mouth every 8 hours as needed for Mild Pain or Moderate Pain.    METHADONE (DOLOPHINE) 5 MG/5ML SOLUTION    Take 47 mg by mouth every day.    THERAPEUTIC MULTIVITAMIN-MINERALS (THERAGRAN-M) TAB    Take 1 Tablet by mouth every day.    VITAMIN D3 (CHOLECALCIFEROL) 400 UNIT TAB    Take 1,000 Units by mouth every day.       ALLERGIES  Pcn [penicillins]    PHYSICAL EXAM  /71   Pulse 80   Temp 36.6 °C (97.8  "°F) (Temporal)   Resp 16   Ht 1.702 m (5' 7\")   Wt 79.6 kg (175 lb 7.8 oz)   SpO2 93%   BMI 27.49 kg/m²     Constitutional: Well developed, Well nourished, mild distress.   HENT: Normocephalic, Atraumatic.   Eyes: Conjunctiva normal, No discharge.   Neck: Supple, No stridor  Cardiovascular: Normal heart rate, Normal rhythm, No murmurs, equal pulses.   Pulmonary: Normal breath sounds, No respiratory distress, No wheezing, No rales, No rhonchi.  Chest: No chest wall tenderness or deformity.   Abdomen:Soft, No tenderness, No masses, no rebound, no guarding.   Back: No CVA tenderness. No vertebral point tenderness.   Musculoskeletal: No major deformities noted, No tenderness.  5 out of 5 strength bilaterally in upper and lower extremities  Skin: Warm, Dry, No erythema, No rash.   Neurologic: Alert & oriented x 3, Normal motor function,  No focal deficits noted. Normal finger to nose, Normal cranial nerves II-XII, No pronator drift. Equal strength in upper and lower extremities bilaterally.   Psychiatric: Affect normal, Judgment normal, Mood normal.       DIAGNOSTIC STUDIES & PROCEDURES    Labs:   Results for orders placed or performed during the hospital encounter of 03/15/24   CBC WITH DIFFERENTIAL   Result Value Ref Range    WBC 3.6 (L) 4.8 - 10.8 K/uL    RBC 4.68 4.20 - 5.40 M/uL    Hemoglobin 14.1 12.0 - 16.0 g/dL    Hematocrit 40.6 37.0 - 47.0 %    MCV 86.8 81.4 - 97.8 fL    MCH 30.1 27.0 - 33.0 pg    MCHC 34.7 32.2 - 35.5 g/dL    RDW 39.0 35.9 - 50.0 fL    Platelet Count 202 164 - 446 K/uL    MPV 10.4 9.0 - 12.9 fL    Neutrophils-Polys 62.10 44.00 - 72.00 %    Lymphocytes 26.10 22.00 - 41.00 %    Monocytes 9.60 0.00 - 13.40 %    Eosinophils 0.80 0.00 - 6.90 %    Basophils 1.40 0.00 - 1.80 %    Immature Granulocytes 0.00 0.00 - 0.90 %    Nucleated RBC 0.00 0.00 - 0.20 /100 WBC    Neutrophils (Absolute) 2.26 1.82 - 7.42 K/uL    Lymphs (Absolute) 0.95 (L) 1.00 - 4.80 K/uL    Monos (Absolute) 0.35 0.00 - 0.85 " K/uL    Eos (Absolute) 0.03 0.00 - 0.51 K/uL    Baso (Absolute) 0.05 0.00 - 0.12 K/uL    Immature Granulocytes (abs) 0.00 0.00 - 0.11 K/uL    NRBC (Absolute) 0.00 K/uL   COMP METABOLIC PANEL   Result Value Ref Range    Sodium 139 135 - 145 mmol/L    Potassium 3.8 3.6 - 5.5 mmol/L    Chloride 102 96 - 112 mmol/L    Co2 24 20 - 33 mmol/L    Anion Gap 13.0 7.0 - 16.0    Glucose 81 65 - 99 mg/dL    Bun 9 8 - 22 mg/dL    Creatinine 0.90 0.50 - 1.40 mg/dL    Calcium 9.1 8.5 - 10.5 mg/dL    Correct Calcium 8.5 8.5 - 10.5 mg/dL    AST(SGOT) 30 12 - 45 U/L    ALT(SGPT) 22 2 - 50 U/L    Alkaline Phosphatase 87 30 - 99 U/L    Total Bilirubin 0.5 0.1 - 1.5 mg/dL    Albumin 4.7 3.2 - 4.9 g/dL    Total Protein 7.6 6.0 - 8.2 g/dL    Globulin 2.9 1.9 - 3.5 g/dL    A-G Ratio 1.6 g/dL   URINALYSIS CULTURE, IF INDICATED    Specimen: Blood   Result Value Ref Range    Color Yellow     Character Clear     Specific Gravity 1.022 <1.035    Ph 5.5 5.0 - 8.0    Glucose Negative Negative mg/dL    Ketones Trace (A) Negative mg/dL    Protein Negative Negative mg/dL    Bilirubin Negative Negative    Urobilinogen, Urine 0.2 Negative    Nitrite Negative Negative    Leukocyte Esterase Small (A) Negative    Occult Blood Small (A) Negative    Micro Urine Req Microscopic    TSH WITH REFLEX TO FT4   Result Value Ref Range    TSH 2.340 0.380 - 5.330 uIU/mL   TROPONIN   Result Value Ref Range    Troponin T 6 6 - 19 ng/L   URINE MICROSCOPIC (W/UA)   Result Value Ref Range    WBC 5-10 (A) /hpf    RBC 5-10 (A) /hpf    Bacteria Few (A) None /hpf    Epithelial Cells Few /hpf    Hyaline Cast 6-10 (A) /lpf   ESTIMATED GFR   Result Value Ref Range    GFR (CKD-EPI) 77 >60 mL/min/1.73 m 2   EKG   Result Value Ref Range    Report       [facility] Emergency Dept.    Test Date:  2024-03-15  Pt Name:    LAUREN ALONSO              Department: ER  MRN:        [id number]                      Room:  Gender:     Female                       Technician:  [id number]  :        [id number]                   Requested By:ER TRIAGE PROTOCOL  Order #:    [id number]                    Reading MD: HERIBERTO CARVER MD    Measurements  Intervals                                Axis  Rate:       88                           P:          51  MT:         162                          QRS:        29  QRSD:       88                           T:          41  QT:         380  QTc:        460    Interpretive Statements  Sinus rhythm, rate of 88, normal axis, no st elevation.  Borderline T abnormalities, anterior leads  Baseline wander in lead(s) V4,V6  No previous ECG available for comparison  Electronically Signed On 03- 15:56:52 PDT by HERIBERTO CARVER MD     POC CoV-2, FLU A/B, RSV by PCR   Result Value Ref Range    POC Influenza A RNA, PCR Negative Negative    POC Influenza B RNA, PCR Negative Negative    POC RSV, by PCR Negative Negative    POC SARS-CoV-2, PCR DETECTED (AA)      All labs reviewed by me.    EKG:   I have independently interpreted this EKG as above    Radiology:   The attending Emergency Physician has independently interpreted the diagnostic imaging associated with this visit and is awaiting the final reading from the radiologist, which will be displayed below.    Preliminary interpretation is a follows: Chest x-ray does not show any pneumonia or rib fracture.  Radiologist interpretation:     DX-CHEST-PORTABLE (1 VIEW)   Final Result      No acute cardiac or pulmonary abnormalities are identified. Lung volumes are low.           COURSE & MEDICAL DECISION MAKING    ED Observation Status? No; Patient does not meet criteria for ED Observation.     INITIAL ASSESSMENT AND PLAN  Care Narrative:       3:26 PM - Patient seen and evaluated at bedside. Maggie Escobar is a 53 y.o. female with a history of hypothyroidism and shortness of breath who presents with weakness.  Ordered Dx- chest, POCT CoV-2 Flu A/B RSV, CBC w/ diff, CMP, UA culture, EKG to  evaluate. She understands and agrees to the plan of care. Differential diagnoses include but are not limited to: COVID, flu, electrolyte abnormality, myocardial infarction, dehydration, hypothyroidism    6:40 PM- Patient was reevaluated at bedside. Discussed lab and radiology results with the patient and informed her of positive Covid results and mild UTI. The patient and I discussed treatment for Covid (Paxlovid), and after much discussion the patient does not want Paxlovid medication. Patient had the opportunity to ask any questions. The plan for discharge was discussed with them and they were told to return for any new or worsening symptoms. She was also informed of the plans for follow up. Patient is understanding and agreeable to the plan for discharge.     PROBLEM LIST AND DISPOSITION  #1 weakness at this point time I think the patient's weakness is likely secondary to COVID-19 as well as a urinary tract infection.  Patient does not show any signs of sepsis or hypoxia.  I did offer the patient Paxlovid but at this point in time because of drug interactions she refuses.  I did check the patient's thyroid which is normal.  She has no unilateral weakness or deficits I do not think she has had a stroke or TIA.    Problem #2 chest pain patient describes this as a cramping sensation mostly in her back.  At this point time I think this is more likely myalgias.  Patient has a heart score of 2 with a normal EKG and normal troponin I do not think she is having myocardial infarction.  This has been going on for over 24 hours I do not think she needs repeat troponin.  Patient is already seen a cardiologist.               DISPOSITION AND DISCUSSIONS  I have discussed management of the patient with the following physicians and BARRETT's: None    Discussion of management with other Q or appropriate source(s): None     Escalation of care considered, and ultimately not performed: acute inpatient care management, however at this  time, the patient is most appropriate for outpatient management.    Barriers to care at this time, including but not limited to: Patient does not have established PCP.     Decision tools and prescription drugs considered including, but not limited to: Antibiotics Bactrim for the UTI .    The patient will return for new or worsening symptoms and is stable at the time of discharge.    DISPOSITION:  Patient will be discharged home in stable condition.    FOLLOW UP:  Your doctor    Schedule an appointment as soon as possible for a visit in 1 week      [facility]  [address] [facility] [address]  [phone]    If you need a doctor    [facility]  [address] [facility] [address]  [phone]    If you need a doctor      OUTPATIENT MEDICATIONS:  New Prescriptions    ONDANSETRON (ZOFRAN ODT) 4 MG TABLET DISPERSIBLE    Take 1 Tablet by mouth every 8 hours as needed for Nausea/Vomiting.    SULFAMETHOXAZOLE-TRIMETHOPRIM (BACTRIM DS) 800-160 MG TABLET    Take 1 Tablet by mouth every 12 hours for 3 days.       FINAL IMPRESSION   1. COVID-19 virus infection    2. Acute UTI    3. Weakness         Jeniffer CUEVAS (Scribe), am scribing for, and in the presence of, HUBER Raines*.    Electronically signed by: Jeniffer Saleem (Ernie), 3/15/2024    Sotero CUEVAS M.* personally performed the services described in this documentation, as scribed by Jeniffer Saleem in my presence, and it is both accurate and complete.    The note accurately reflects work and decisions made by me.  Sotero Crews M.D.  3/15/2024  8:20 PM

## 2024-03-16 NOTE — ED NOTES
.Bedside report received from off going RN/tech: Nilay, RN, assumed care of patient.  POC discussed with patient. Call light within reach, all needs addressed at this time.       Fall risk interventions in place: Move the patient closer to the nurse's station, Patient's personal possessions are with in their safe reach, Place socks on patient, Place fall risk sign on patient's door, Keep floor surfaces clean and dry, and Accompanied to restroom (all applicable per [place] Fall risk assessment)   Continuous monitoring: Cardiac Leads, Pulse Ox, or Blood Pressure  IVF/IV medications: Not Applicable   Oxygen: Room Air  Bedside sitter: Not Applicable   Isolation: Isolation precautions for COVID (Isolation order)

## 2024-03-16 NOTE — DISCHARGE INSTRUCTIONS
Take all your antibiotics until gone.  Return the emergency department if you have increasing weakness, unilateral weakness, shortness of breath, productive cough, persistently rapid heart rate or fever that will not go down with Tylenol or ibuprofen.

## 2024-03-16 NOTE — ED NOTES
Pt approached triage desk after being discharged expressing desire to file a complaint. Pt provided with phone number for grievance line, encouraged to leave a message for staff to follow up if no answer at this time. Charge RN/Supervisor aware.

## 2024-03-16 NOTE — ED NOTES
.Assumed report and patient care from POLINA Callejas. Patient is resting comfortably in bed with no signs of labored breathing or restlessness. POC discussed.  Safety measures in place, call light within reach.

## (undated) DEVICE — TRAY SRGPRP PVP IOD WT PRP - (20/CA)

## (undated) DEVICE — SUTURE 2-0 VICRYL PLUS CT-1 36 (36PK/BX)"

## (undated) DEVICE — KIT  I.V. START (100EA/CA)

## (undated) DEVICE — SPONGE GAUZESTER. 2X2 4-PL - (2/PK 50PK/BX 30BX/CS)

## (undated) DEVICE — WATER IRRIGATION STERILE 1000ML (12EA/CA)

## (undated) DEVICE — LIGASURE 5MM BLUNT TIP LONG - 44CM (6EA/PK)

## (undated) DEVICE — LACTATED RINGERS INJ 1000 ML - (14EA/CA 60CA/PF)

## (undated) DEVICE — SUCTION INSTRUMENT YANKAUER BULBOUS TIP W/O VENT (50EA/CA)

## (undated) DEVICE — ELECTRODE 850 FOAM ADHESIVE - HYDROGEL RADIOTRNSPRNT (50/PK)

## (undated) DEVICE — NEPTUNE 4 PORT MANIFOLD - (20/PK)

## (undated) DEVICE — GOWN WARMING STANDARD FLEX - (30/CA)

## (undated) DEVICE — NEEDLE INSUFFLATION FOR STEP - (12/BX)

## (undated) DEVICE — SENSOR SPO2 NEO LNCS ADHESIVE (20/BX) SEE USER NOTES

## (undated) DEVICE — DRESSING TRANSPARENT FILM TEGADERM 2.375 X 2.75"  (100EA/BX)"

## (undated) DEVICE — SUTURE GENERAL

## (undated) DEVICE — SUTURE 0 VICRYL PLUS CT-1 - 36 INCH (36/BX)

## (undated) DEVICE — PACK LAPAROSCOPY - (1/CA)

## (undated) DEVICE — MASK ANESTHESIA ADULT  - (100/CA)

## (undated) DEVICE — CANISTER SUCTION RIGID RED 1500CC (40EA/CA)

## (undated) DEVICE — SET IRRIGATION CYSTOSCOPY TUBE L80 IN (20EA/CA)

## (undated) DEVICE — TRAY FOLEY CATHETER STATLOCK 16FR SURESTEP  (10EA/CA)

## (undated) DEVICE — Device

## (undated) DEVICE — PAD SANITARY 11IN MAXI IND WRAPPED  (12EA/PK 24PK/CA)

## (undated) DEVICE — TOWEL STOP TIMEOUT SAFETY FLAG (40EA/CA)

## (undated) DEVICE — GOWN SURGEONS X-LARGE - DISP. (30/CA)

## (undated) DEVICE — KIT ANESTHESIA W/CIRCUIT & 3/LT BAG W/FILTER (20EA/CA)

## (undated) DEVICE — CHLORAPREP 26 ML APPLICATOR - ORANGE TINT(25/CA)

## (undated) DEVICE — CANISTER SUCTION 3000ML MECHANICAL FILTER AUTO SHUTOFF MEDI-VAC NONSTERILE LF DISP  (40EA/CA)

## (undated) DEVICE — TUBING LAPAROSCOPIC PLUME DEVICE (10EA/CA)

## (undated) DEVICE — SET EXTENSION WITH 2 PORTS (48EA/CA) ***PART #2C8610 IS A SUBSTITUTE*****

## (undated) DEVICE — SET LEADWIRE 5 LEAD BEDSIDE DISPOSABLE ECG (1SET OF 5/EA)

## (undated) DEVICE — ARMREST CRADLE FOAM - (2PR/PK 12PR/CA)

## (undated) DEVICE — ENDO PEANUT 5MM DEVICE (12EA/BX)

## (undated) DEVICE — AGENT HEMOSTATIC BELLOW HEMOBLAST (12EA/CA)

## (undated) DEVICE — MANIFOLD NEPTUNE 1 PORT (20/PK)

## (undated) DEVICE — SUTURE 0 VICRYL PLUS UR-6 - 27 INCH (36/BX)

## (undated) DEVICE — SET SUCTION/IRRIGATION WITH DISPOSABLE TIP (6/CA )PART #0250-070-520 IS A SUB

## (undated) DEVICE — ELECTRODE DUAL RETURN W/ CORD - (50/PK)

## (undated) DEVICE — HEAD HOLDER JUNIOR/ADULT

## (undated) DEVICE — DRAPESURG STERI-DRAPE LONG - (10/BX 4BX/CA)

## (undated) DEVICE — TUBING CLEARLINK DUO-VENT - C-FLO (48EA/CA)

## (undated) DEVICE — SUTURE 4-0 MONOCRYL PLUS PS-2 - 27 INCH (36/BX)

## (undated) DEVICE — GLOVE BIOGEL SZ 7.5 SURGICAL PF LTX - (50PR/BX 4BX/CA)

## (undated) DEVICE — SPONGE XRAY 8X4 STERL. 12PL - (10EA/TY 80TY/CA)

## (undated) DEVICE — SODIUM CHL IRRIGATION 0.9% 1000ML (12EA/CA)

## (undated) DEVICE — TROCAR STEP 11MM - (3/CA)

## (undated) DEVICE — SLEEVE, VASO, THIGH, MED

## (undated) DEVICE — UTERINE MANIP V-CARE LARGE - (DAVINCI)  8/CA

## (undated) DEVICE — CATHETER IV 20 GA X 1-1/4 ---SURG.& SDS ONLY--- (50EA/BX)

## (undated) DEVICE — TUBE CONNECTING SUCTION - CLEAR PLASTIC STERILE 72 IN (50EA/CA)